# Patient Record
Sex: FEMALE | Race: WHITE | NOT HISPANIC OR LATINO
[De-identification: names, ages, dates, MRNs, and addresses within clinical notes are randomized per-mention and may not be internally consistent; named-entity substitution may affect disease eponyms.]

---

## 2018-01-31 ENCOUNTER — RESULT REVIEW (OUTPATIENT)
Age: 33
End: 2018-01-31

## 2018-02-27 ENCOUNTER — RESULT REVIEW (OUTPATIENT)
Age: 33
End: 2018-02-27

## 2018-06-08 ENCOUNTER — OUTPATIENT (OUTPATIENT)
Dept: OUTPATIENT SERVICES | Facility: HOSPITAL | Age: 33
LOS: 1 days | End: 2018-06-08
Payer: COMMERCIAL

## 2018-06-08 VITALS
OXYGEN SATURATION: 100 % | HEIGHT: 62.5 IN | HEART RATE: 109 BPM | SYSTOLIC BLOOD PRESSURE: 131 MMHG | RESPIRATION RATE: 14 BRPM | DIASTOLIC BLOOD PRESSURE: 85 MMHG | WEIGHT: 159.39 LBS | TEMPERATURE: 98 F

## 2018-06-08 DIAGNOSIS — K08.409 PARTIAL LOSS OF TEETH, UNSPECIFIED CAUSE, UNSPECIFIED CLASS: Chronic | ICD-10-CM

## 2018-06-08 DIAGNOSIS — Z01.818 ENCOUNTER FOR OTHER PREPROCEDURAL EXAMINATION: ICD-10-CM

## 2018-06-08 DIAGNOSIS — K80.10 CALCULUS OF GALLBLADDER WITH CHRONIC CHOLECYSTITIS WITHOUT OBSTRUCTION: ICD-10-CM

## 2018-06-08 DIAGNOSIS — K80.20 CALCULUS OF GALLBLADDER WITHOUT CHOLECYSTITIS WITHOUT OBSTRUCTION: ICD-10-CM

## 2018-06-08 DIAGNOSIS — K43.6 OTHER AND UNSPECIFIED VENTRAL HERNIA WITH OBSTRUCTION, WITHOUT GANGRENE: ICD-10-CM

## 2018-06-08 DIAGNOSIS — T75.3XXA MOTION SICKNESS, INITIAL ENCOUNTER: ICD-10-CM

## 2018-06-08 LAB
ALBUMIN SERPL ELPH-MCNC: 3.8 G/DL — SIGNIFICANT CHANGE UP (ref 3.3–5)
ALP SERPL-CCNC: 47 U/L — SIGNIFICANT CHANGE UP (ref 30–120)
ALT FLD-CCNC: 23 U/L DA — SIGNIFICANT CHANGE UP (ref 10–60)
ANION GAP SERPL CALC-SCNC: 9 MMOL/L — SIGNIFICANT CHANGE UP (ref 5–17)
AST SERPL-CCNC: 19 U/L — SIGNIFICANT CHANGE UP (ref 10–40)
BILIRUB SERPL-MCNC: 0.3 MG/DL — SIGNIFICANT CHANGE UP (ref 0.2–1.2)
BUN SERPL-MCNC: 10 MG/DL — SIGNIFICANT CHANGE UP (ref 7–23)
CALCIUM SERPL-MCNC: 9.1 MG/DL — SIGNIFICANT CHANGE UP (ref 8.4–10.5)
CHLORIDE SERPL-SCNC: 104 MMOL/L — SIGNIFICANT CHANGE UP (ref 96–108)
CO2 SERPL-SCNC: 27 MMOL/L — SIGNIFICANT CHANGE UP (ref 22–31)
CREAT SERPL-MCNC: 1 MG/DL — SIGNIFICANT CHANGE UP (ref 0.5–1.3)
GLUCOSE SERPL-MCNC: 73 MG/DL — SIGNIFICANT CHANGE UP (ref 70–99)
HCT VFR BLD CALC: 38.3 % — SIGNIFICANT CHANGE UP (ref 34.5–45)
HGB BLD-MCNC: 13.2 G/DL — SIGNIFICANT CHANGE UP (ref 11.5–15.5)
MCHC RBC-ENTMCNC: 31.8 PG — SIGNIFICANT CHANGE UP (ref 27–34)
MCHC RBC-ENTMCNC: 34.5 GM/DL — SIGNIFICANT CHANGE UP (ref 32–36)
MCV RBC AUTO: 92.2 FL — SIGNIFICANT CHANGE UP (ref 80–100)
PLATELET # BLD AUTO: 380 K/UL — SIGNIFICANT CHANGE UP (ref 150–400)
POTASSIUM SERPL-MCNC: 3.4 MMOL/L — LOW (ref 3.5–5.3)
POTASSIUM SERPL-SCNC: 3.4 MMOL/L — LOW (ref 3.5–5.3)
PROT SERPL-MCNC: 8.3 G/DL — SIGNIFICANT CHANGE UP (ref 6–8.3)
RBC # BLD: 4.15 M/UL — SIGNIFICANT CHANGE UP (ref 3.8–5.2)
RBC # FLD: 10.8 % — SIGNIFICANT CHANGE UP (ref 10.3–14.5)
SODIUM SERPL-SCNC: 140 MMOL/L — SIGNIFICANT CHANGE UP (ref 135–145)
WBC # BLD: 6.1 K/UL — SIGNIFICANT CHANGE UP (ref 3.8–10.5)
WBC # FLD AUTO: 6.1 K/UL — SIGNIFICANT CHANGE UP (ref 3.8–10.5)

## 2018-06-08 PROCEDURE — G0463: CPT

## 2018-06-08 PROCEDURE — 36415 COLL VENOUS BLD VENIPUNCTURE: CPT

## 2018-06-08 PROCEDURE — 86900 BLOOD TYPING SEROLOGIC ABO: CPT

## 2018-06-08 PROCEDURE — 85027 COMPLETE CBC AUTOMATED: CPT

## 2018-06-08 PROCEDURE — 86850 RBC ANTIBODY SCREEN: CPT

## 2018-06-08 PROCEDURE — 86901 BLOOD TYPING SEROLOGIC RH(D): CPT

## 2018-06-08 PROCEDURE — 80053 COMPREHEN METABOLIC PANEL: CPT

## 2018-06-08 RX ORDER — CHLORHEXIDINE GLUCONATE 213 G/1000ML
1 SOLUTION TOPICAL ONCE
Qty: 0 | Refills: 0 | Status: DISCONTINUED | OUTPATIENT
Start: 2018-06-08 | End: 2018-06-23

## 2018-06-08 NOTE — H&P PST ADULT - FAMILY HISTORY
Mother  Still living? Yes, Estimated age: 51-60  Family history of heart failure, Age at diagnosis: Age Unknown  Family history of gout, Age at diagnosis: Age Unknown  Family history of arthritis, Age at diagnosis: Age Unknown  Family history of essential hypertension, Age at diagnosis: Age Unknown     Grandparent  Still living? Yes, Estimated age: 71-80  Family history of melanoma, Age at diagnosis: Age Unknown  Family history of heart attack, Age at diagnosis: Age Unknown

## 2018-06-08 NOTE — H&P PST ADULT - HISTORY OF PRESENT ILLNESS
this is a 32 y/o female who had RUQ pain 2 weeks ago for several days and has soreness now; also has pain after eating; had tests which showed gallstone; also upon exam hernia found- to have gallbladder removed and hernia repaired

## 2018-06-08 NOTE — H&P PST ADULT - NSANTHOSAYNRD_GEN_A_CORE
No. DARSHAN screening performed.  STOP BANG Legend: 0-2 = LOW Risk; 3-4 = INTERMEDIATE Risk; 5-8 = HIGH Risk

## 2018-06-12 NOTE — ASU DISCHARGE PLAN (ADULT/PEDIATRIC). - "IF YOU OR YOUR GUARDIAN/FAMILY IS A SMOKER, IT IS IMPORTANT FOR YOUR HEALTH TO STOP SMOKING. PLEASE BE AWARE THAT SECOND HAND SMOKE IS ALSO HARMFUL."
Request for:  Metoprolol  Benazepril  Amlodipine  Pravastatin    Last OV 10/27/17 with note to RTC in six months. Due for OV late March/early April  One refill for 90 days sent   Statement Selected

## 2018-06-12 NOTE — ASU DISCHARGE PLAN (ADULT/PEDIATRIC). - SPECIAL INSTRUCTIONS
apply ice to operative site 20 minutes on and 20 minutes off for next 48 hours  Pain meds as per MD  Take an over the counter stool softener like Colace to avoid constipation while taking a narcotic for pain REST!  Relax!  Ice packs to operative sites and shoulders 30 minutes on and 30 minutes every hour while awake for next 48 hours.  May take Tylenol for minor pain.  Please minimize Aspirin, Advil and Motrin for next 48 hours.  A script for Oxycodone has been forwarded to your pharmacy.  Take an over the counter stool softener like COLACE twice daily to prevent constipation.

## 2018-06-12 NOTE — ASU DISCHARGE PLAN (ADULT/PEDIATRIC). - NOTIFY
Persistent Nausea and Vomiting/Fever greater than 101/Unable to Urinate/Bleeding that does not stop/Pain not relieved by Medications

## 2018-06-12 NOTE — ASU DISCHARGE PLAN (ADULT/PEDIATRIC). - MEDICATION SUMMARY - MEDICATIONS TO TAKE
I will START or STAY ON the medications listed below when I get home from the hospital:    oxyCODONE 5 mg oral tablet  -- 1 tab(s) by mouth every 4 hours, As Needed MDD:6  -- Indication: For Treatment of incisional pain.    Valtrex 500 mg oral tablet  -- 1 tab(s) by mouth once a day  -- Indication: For Continuation of home medication.    levonorgestrel-ethinyl estradiol 100 mcg-20 mcg oral tablet  -- 1 tab(s) by mouth once a day  -- Indication: For Continuation of home medication.

## 2018-06-12 NOTE — ASU DISCHARGE PLAN (ADULT/PEDIATRIC). - FOLLOWUP APPOINTMENT CLINIC/PHYSICIAN
Call Dr. David's office at [phone] to make an appointment to be seen within 7- 10 days Call Dr. David's office at [phone] to make an appointment to be seen in ~2 weeks.

## 2018-06-17 ENCOUNTER — TRANSCRIPTION ENCOUNTER (OUTPATIENT)
Age: 33
End: 2018-06-17

## 2018-06-18 ENCOUNTER — OUTPATIENT (OUTPATIENT)
Dept: OUTPATIENT SERVICES | Facility: HOSPITAL | Age: 33
LOS: 1 days | End: 2018-06-18
Payer: COMMERCIAL

## 2018-06-18 ENCOUNTER — RESULT REVIEW (OUTPATIENT)
Age: 33
End: 2018-06-18

## 2018-06-18 VITALS
RESPIRATION RATE: 17 BRPM | OXYGEN SATURATION: 100 % | WEIGHT: 157.63 LBS | DIASTOLIC BLOOD PRESSURE: 77 MMHG | TEMPERATURE: 99 F | HEART RATE: 81 BPM | HEIGHT: 62 IN | SYSTOLIC BLOOD PRESSURE: 133 MMHG

## 2018-06-18 VITALS
OXYGEN SATURATION: 99 % | DIASTOLIC BLOOD PRESSURE: 74 MMHG | HEART RATE: 70 BPM | SYSTOLIC BLOOD PRESSURE: 121 MMHG | RESPIRATION RATE: 15 BRPM

## 2018-06-18 DIAGNOSIS — K43.6 OTHER AND UNSPECIFIED VENTRAL HERNIA WITH OBSTRUCTION, WITHOUT GANGRENE: ICD-10-CM

## 2018-06-18 DIAGNOSIS — K80.10 CALCULUS OF GALLBLADDER WITH CHRONIC CHOLECYSTITIS WITHOUT OBSTRUCTION: ICD-10-CM

## 2018-06-18 DIAGNOSIS — K08.409 PARTIAL LOSS OF TEETH, UNSPECIFIED CAUSE, UNSPECIFIED CLASS: Chronic | ICD-10-CM

## 2018-06-18 LAB
ABO RH CONFIRMATION: SIGNIFICANT CHANGE UP
POTASSIUM SERPL-MCNC: 3.7 MMOL/L — SIGNIFICANT CHANGE UP (ref 3.5–5.3)
POTASSIUM SERPL-SCNC: 3.7 MMOL/L — SIGNIFICANT CHANGE UP (ref 3.5–5.3)

## 2018-06-18 PROCEDURE — 88302 TISSUE EXAM BY PATHOLOGIST: CPT

## 2018-06-18 PROCEDURE — 47562 LAPAROSCOPIC CHOLECYSTECTOMY: CPT

## 2018-06-18 PROCEDURE — C1889: CPT

## 2018-06-18 PROCEDURE — 47562 LAPAROSCOPIC CHOLECYSTECTOMY: CPT | Mod: AS

## 2018-06-18 PROCEDURE — 84132 ASSAY OF SERUM POTASSIUM: CPT

## 2018-06-18 PROCEDURE — 49561: CPT | Mod: AS,59

## 2018-06-18 PROCEDURE — 88302 TISSUE EXAM BY PATHOLOGIST: CPT | Mod: 26

## 2018-06-18 RX ORDER — HYDROMORPHONE HYDROCHLORIDE 2 MG/ML
0.5 INJECTION INTRAMUSCULAR; INTRAVENOUS; SUBCUTANEOUS
Qty: 0 | Refills: 0 | Status: DISCONTINUED | OUTPATIENT
Start: 2018-06-18 | End: 2018-06-18

## 2018-06-18 RX ORDER — ONDANSETRON 8 MG/1
4 TABLET, FILM COATED ORAL ONCE
Qty: 0 | Refills: 0 | Status: DISCONTINUED | OUTPATIENT
Start: 2018-06-18 | End: 2018-06-18

## 2018-06-18 RX ORDER — METOCLOPRAMIDE HCL 10 MG
10 TABLET ORAL ONCE
Qty: 0 | Refills: 0 | Status: COMPLETED | OUTPATIENT
Start: 2018-06-18 | End: 2018-06-18

## 2018-06-18 RX ORDER — OXYCODONE HYDROCHLORIDE 5 MG/1
1 TABLET ORAL
Qty: 30 | Refills: 0
Start: 2018-06-18

## 2018-06-18 RX ORDER — CHLORHEXIDINE GLUCONATE 213 G/1000ML
1 SOLUTION TOPICAL ONCE
Qty: 0 | Refills: 0 | Status: COMPLETED | OUTPATIENT
Start: 2018-06-18 | End: 2018-06-18

## 2018-06-18 RX ORDER — ACETAMINOPHEN 500 MG
1000 TABLET ORAL ONCE
Qty: 0 | Refills: 0 | Status: COMPLETED | OUTPATIENT
Start: 2018-06-18 | End: 2018-06-18

## 2018-06-18 RX ORDER — CEFAZOLIN SODIUM 1 G
2000 VIAL (EA) INJECTION ONCE
Qty: 0 | Refills: 0 | Status: COMPLETED | OUTPATIENT
Start: 2018-06-18 | End: 2018-06-18

## 2018-06-18 RX ORDER — SODIUM CHLORIDE 9 MG/ML
1000 INJECTION, SOLUTION INTRAVENOUS
Qty: 0 | Refills: 0 | Status: DISCONTINUED | OUTPATIENT
Start: 2018-06-18 | End: 2018-06-18

## 2018-06-18 RX ORDER — KETOROLAC TROMETHAMINE 30 MG/ML
30 SYRINGE (ML) INJECTION ONCE
Qty: 0 | Refills: 0 | Status: DISCONTINUED | OUTPATIENT
Start: 2018-06-18 | End: 2018-06-18

## 2018-06-18 RX ADMIN — Medication 200 MILLIGRAM(S): at 15:15

## 2018-06-18 RX ADMIN — Medication 10 MILLIGRAM(S): at 14:34

## 2018-06-18 RX ADMIN — Medication 30 MILLIGRAM(S): at 14:02

## 2018-06-18 RX ADMIN — SODIUM CHLORIDE 75 MILLILITER(S): 9 INJECTION, SOLUTION INTRAVENOUS at 14:12

## 2018-06-18 RX ADMIN — Medication 400 MILLIGRAM(S): at 13:55

## 2018-06-18 RX ADMIN — HYDROMORPHONE HYDROCHLORIDE 0.5 MILLIGRAM(S): 2 INJECTION INTRAMUSCULAR; INTRAVENOUS; SUBCUTANEOUS at 14:28

## 2018-06-18 RX ADMIN — Medication 1000 MILLIGRAM(S): at 14:13

## 2018-06-18 RX ADMIN — Medication 30 MILLIGRAM(S): at 14:28

## 2018-06-18 RX ADMIN — CHLORHEXIDINE GLUCONATE 1 APPLICATION(S): 213 SOLUTION TOPICAL at 10:45

## 2018-06-18 RX ADMIN — HYDROMORPHONE HYDROCHLORIDE 0.5 MILLIGRAM(S): 2 INJECTION INTRAMUSCULAR; INTRAVENOUS; SUBCUTANEOUS at 14:14

## 2018-06-18 NOTE — BRIEF OPERATIVE NOTE - PRE-OP DX
Calculus of gallbladder with chronic cholecystitis without obstruction  06/18/2018    Active  Ilia Alejo  Other ventral hernia with obstruction and without gangrene  06/18/2018    Active  Ilia Alejo

## 2018-06-18 NOTE — BRIEF OPERATIVE NOTE - PROCEDURE
<<-----Click on this checkbox to enter Procedure Repair, ventral hernia, incarcerated, initial  06/18/2018  Laparoscopic cholecystectomy with open repair of incarcerated ventral hernia without mesh.  Active  Ilia Alejo  Laparoscopic cholecystectomy  06/18/2018    Active  Ilia Alejo J

## 2018-09-11 PROBLEM — G47.00 INSOMNIA, UNSPECIFIED: Chronic | Status: ACTIVE | Noted: 2018-06-08

## 2018-09-11 PROBLEM — T75.3XXA MOTION SICKNESS, INITIAL ENCOUNTER: Chronic | Status: ACTIVE | Noted: 2018-06-08

## 2018-09-11 PROBLEM — R51 HEADACHE: Chronic | Status: ACTIVE | Noted: 2018-06-08

## 2018-09-20 PROBLEM — Z00.00 ENCOUNTER FOR PREVENTIVE HEALTH EXAMINATION: Status: ACTIVE | Noted: 2018-09-20

## 2018-10-24 ENCOUNTER — TRANSCRIPTION ENCOUNTER (OUTPATIENT)
Age: 33
End: 2018-10-24

## 2018-10-24 ENCOUNTER — APPOINTMENT (OUTPATIENT)
Dept: PULMONOLOGY | Facility: CLINIC | Age: 33
End: 2018-10-24
Payer: COMMERCIAL

## 2018-10-24 VITALS
DIASTOLIC BLOOD PRESSURE: 85 MMHG | OXYGEN SATURATION: 96 % | SYSTOLIC BLOOD PRESSURE: 135 MMHG | HEART RATE: 83 BPM | HEIGHT: 62 IN

## 2018-10-24 PROCEDURE — 99204 OFFICE O/P NEW MOD 45 MIN: CPT

## 2018-10-25 ENCOUNTER — MEDICATION RENEWAL (OUTPATIENT)
Age: 33
End: 2018-10-25

## 2018-10-25 RX ORDER — MULTIVITAMIN
TABLET ORAL
Refills: 0 | Status: ACTIVE | COMMUNITY

## 2018-10-26 ENCOUNTER — MEDICATION RENEWAL (OUTPATIENT)
Age: 33
End: 2018-10-26

## 2018-10-31 ENCOUNTER — APPOINTMENT (OUTPATIENT)
Dept: PULMONOLOGY | Facility: CLINIC | Age: 33
End: 2018-10-31

## 2018-11-27 ENCOUNTER — APPOINTMENT (OUTPATIENT)
Dept: PULMONOLOGY | Facility: CLINIC | Age: 33
End: 2018-11-27
Payer: COMMERCIAL

## 2018-11-27 PROCEDURE — 96101: CPT

## 2018-11-27 PROCEDURE — 90791 PSYCH DIAGNOSTIC EVALUATION: CPT | Mod: 59

## 2018-11-30 ENCOUNTER — MEDICATION RENEWAL (OUTPATIENT)
Age: 33
End: 2018-11-30

## 2018-12-04 ENCOUNTER — APPOINTMENT (OUTPATIENT)
Dept: PULMONOLOGY | Facility: CLINIC | Age: 33
End: 2018-12-04
Payer: COMMERCIAL

## 2018-12-04 PROCEDURE — 90837 PSYTX W PT 60 MINUTES: CPT

## 2018-12-11 ENCOUNTER — APPOINTMENT (OUTPATIENT)
Dept: PULMONOLOGY | Facility: CLINIC | Age: 33
End: 2018-12-11

## 2018-12-28 ENCOUNTER — MEDICATION RENEWAL (OUTPATIENT)
Age: 33
End: 2018-12-28

## 2019-01-08 ENCOUNTER — APPOINTMENT (OUTPATIENT)
Dept: PULMONOLOGY | Facility: CLINIC | Age: 34
End: 2019-01-08

## 2019-01-23 ENCOUNTER — MEDICATION RENEWAL (OUTPATIENT)
Age: 34
End: 2019-01-23

## 2019-02-05 ENCOUNTER — RESULT REVIEW (OUTPATIENT)
Age: 34
End: 2019-02-05

## 2019-02-11 ENCOUNTER — MEDICATION RENEWAL (OUTPATIENT)
Age: 34
End: 2019-02-11

## 2019-02-15 ENCOUNTER — RESULT REVIEW (OUTPATIENT)
Age: 34
End: 2019-02-15

## 2019-03-03 ENCOUNTER — TRANSCRIPTION ENCOUNTER (OUTPATIENT)
Age: 34
End: 2019-03-03

## 2019-03-06 ENCOUNTER — APPOINTMENT (OUTPATIENT)
Dept: PULMONOLOGY | Facility: CLINIC | Age: 34
End: 2019-03-06

## 2019-03-18 ENCOUNTER — MEDICATION RENEWAL (OUTPATIENT)
Age: 34
End: 2019-03-18

## 2019-04-19 ENCOUNTER — MEDICATION RENEWAL (OUTPATIENT)
Age: 34
End: 2019-04-19

## 2019-05-22 ENCOUNTER — MEDICATION RENEWAL (OUTPATIENT)
Age: 34
End: 2019-05-22

## 2019-07-18 ENCOUNTER — MEDICATION RENEWAL (OUTPATIENT)
Age: 34
End: 2019-07-18

## 2019-08-21 ENCOUNTER — MEDICATION RENEWAL (OUTPATIENT)
Age: 34
End: 2019-08-21

## 2019-08-26 ENCOUNTER — APPOINTMENT (OUTPATIENT)
Dept: PULMONOLOGY | Facility: CLINIC | Age: 34
End: 2019-08-26

## 2019-10-09 ENCOUNTER — MEDICATION RENEWAL (OUTPATIENT)
Age: 34
End: 2019-10-09

## 2019-11-18 ENCOUNTER — APPOINTMENT (OUTPATIENT)
Dept: PULMONOLOGY | Facility: CLINIC | Age: 34
End: 2019-11-18
Payer: COMMERCIAL

## 2019-11-18 VITALS
SYSTOLIC BLOOD PRESSURE: 126 MMHG | WEIGHT: 149 LBS | BODY MASS INDEX: 27.42 KG/M2 | DIASTOLIC BLOOD PRESSURE: 84 MMHG | TEMPERATURE: 98 F | HEART RATE: 87 BPM | HEIGHT: 62 IN | RESPIRATION RATE: 16 BRPM | OXYGEN SATURATION: 98 %

## 2019-11-18 PROCEDURE — 99214 OFFICE O/P EST MOD 30 MIN: CPT

## 2019-11-18 NOTE — ASSESSMENT
[FreeTextEntry1] : 35yo F with history of chronic insomnia difficulty falling and staying asleep. Typically, she will take OTC sleep aids around 10pm. She will usually fall asleep within an hour but then wake up several times throughout the night. 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 Her sleep is significantly fragmented with 3-6 awakenings per night. If she were to sleep later, she still wakes up frequently. \par \par For her insomnia, she has been on zolpidem 5mg nightly. She continues to deny snoring, nocturnal gasping, choking, witnessed apneas and has not gained any significant weight recently. \par \par Chronic Insomnia\par She has tried CBT but didn’t find that helpful\par Recommended relaxation technique consistently right before sleep to link this behavior to falling asleep\par Offered CBT again -- declined\par Ambien 5mg -- will refill

## 2019-11-18 NOTE — HISTORY OF PRESENT ILLNESS
[FreeTextEntry1] : 33yo F with history of chronic insomnia difficulty falling and staying asleep. Typically, she will take OTC sleep aids around 10pm. She will usually fall asleep within an hour but then wake up several times throughout the night. 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 Her sleep is significantly fragmented with 3-6 awakenings per night. If she were to sleep later, she still wakes up frequently. \par \par For her insomnia, she has been on zolpidem 5mg nightly. She continues to deny snoring, nocturnal gasping, choking, witnessed apneas and has not gained any significant weight recently.

## 2019-11-18 NOTE — PHYSICAL EXAM
[General Appearance - In No Acute Distress] : no acute distress [Normal Conjunctiva] : the conjunctiva exhibited no abnormalities [II] : II [Heart Rate And Rhythm] : heart rate was normal and rhythm regular [Neck Appearance] : the appearance of the neck was normal [Heart Sounds] : normal S1 and S2 [Involuntary Movements] : no involuntary movements were seen [Auscultation Breath Sounds / Voice Sounds] : lungs were clear to auscultation bilaterally [Respiration, Rhythm And Depth] : normal respiratory rhythm and effort [Non-Pitting] : non-pitting [Nail Clubbing] : no clubbing of the fingernails [No Focal Deficits] : no focal deficits [Skin Color & Pigmentation] : normal skin color and pigmentation [Oriented To Time, Place, And Person] : oriented to person, place, and time [Normal Oropharynx] : abnormal oropharynx

## 2019-11-18 NOTE — REVIEW OF SYSTEMS
[Fatigue] : fatigue [Difficulty Maintaining Sleep] : difficulty maintaining sleep [Difficulty Initiating Sleep] : difficulty falling asleep [Negative] : Psychiatric [EDS: ESS=____] : no daytime somnolence [Snoring] : no snoring [Chest Pain] : no chest pain [Obesity] : not obese [Anemia] : no anemia [A.M. Headache] : no headache present upon awakening [Heartburn] : no heartburn [Nocturia] : no nocturia [Lower Extremity Discomfort] : no lower extremity discomfort [Unusual Sleep Behavior] : no unusual sleep behavior [Cataplexy] :  no cataplexy

## 2019-12-09 ENCOUNTER — MEDICATION RENEWAL (OUTPATIENT)
Age: 34
End: 2019-12-09

## 2020-01-03 ENCOUNTER — MEDICATION RENEWAL (OUTPATIENT)
Age: 35
End: 2020-01-03

## 2020-02-09 ENCOUNTER — RESULT REVIEW (OUTPATIENT)
Age: 35
End: 2020-02-09

## 2020-03-18 ENCOUNTER — RESULT REVIEW (OUTPATIENT)
Age: 35
End: 2020-03-18

## 2020-10-25 ENCOUNTER — RX RENEWAL (OUTPATIENT)
Age: 35
End: 2020-10-25

## 2020-12-30 ENCOUNTER — APPOINTMENT (OUTPATIENT)
Dept: PULMONOLOGY | Facility: CLINIC | Age: 35
End: 2020-12-30

## 2021-01-05 ENCOUNTER — APPOINTMENT (OUTPATIENT)
Dept: PULMONOLOGY | Facility: CLINIC | Age: 36
End: 2021-01-05
Payer: COMMERCIAL

## 2021-01-05 PROCEDURE — 99215 OFFICE O/P EST HI 40 MIN: CPT | Mod: 95

## 2021-01-05 NOTE — REVIEW OF SYSTEMS
[Fatigue] : fatigue [Difficulty Initiating Sleep] : difficulty falling asleep [Difficulty Maintaining Sleep] : difficulty maintaining sleep [Negative] : Psychiatric [EDS: ESS=____] : no daytime somnolence [Snoring] : no snoring [Chest Pain] : no chest pain [Obesity] : not obese [Anemia] : no anemia [A.M. Headache] : no headache present upon awakening [Heartburn] : no heartburn [Nocturia] : no nocturia [Lower Extremity Discomfort] : no lower extremity discomfort [Unusual Sleep Behavior] : no unusual sleep behavior [Cataplexy] :  no cataplexy

## 2021-01-05 NOTE — ASSESSMENT
[FreeTextEntry1] : 34yo F with history of chronic insomnia difficulty falling and staying asleep. She has tried OTC sleep aids in the past without much help. She will usually fall asleep within an hour but then wake up several times throughout the night. 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 Her sleep is significantly fragmented with 3-6 awakenings per night. If she were to sleep later, she still wakes up frequently. \par \par For her insomnia, she had been on zolpidem 5mg nightly. We switched this recently to 6.25mg ER daily. However, this is only intermittently effective for her and she would like to try a higher dose of 12.5mg daily. Currently not experiencing any side effects related to zolpidem. \par \par She continues to deny snoring, nocturnal gasping, choking, witnessed apneas and has not gained any significant weight recently. \par \par Chronic insomnia\par No therapeutic benefit with CBT in the past\par Ambien 6.25mg -- patient to try a higher dose tonight and will call tomorrow to let me know  how she fares\par Advised on "drug holidays" or intermittent rather than continuous use of ambien in order to maintain efficacy\par \par Stimulus control: I advised the patient to avoid spending large amounts of time in bed awake, to get into bed only when sleepy in order to increase sleep efficiency. I explained the role of this intervention in stregthening the connection between the bed, bedroom environment and the ability to fall asleep.\par

## 2021-01-05 NOTE — REASON FOR VISIT
[Home] : at home, [unfilled] , at the time of the visit. [Medical Office: ([facility])___] : at the medical office located in  [Verbal consent obtained from patient] : the patient, [unfilled] [Follow-Up] : a follow-up visit

## 2021-01-05 NOTE — HISTORY OF PRESENT ILLNESS
[FreeTextEntry1] : 34yo F with history of chronic insomnia difficulty falling and staying asleep. She has tried OTC sleep aids in the past without much help. She will usually fall asleep within an hour but then wake up several times throughout the night. 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 Her sleep is significantly fragmented with 3-6 awakenings per night. If she were to sleep later, she still wakes up frequently. \par \par For her insomnia, she had been on zolpidem 5mg nightly. We switched this recently to 6.25mg ER daily. However, this is only intermittently effective for her and she would like to try a higher dose of 12.5mg daily. Currently not experiencing any side effects related to zolpidem. \par \par She continues to deny snoring, nocturnal gasping, choking, witnessed apneas and has not gained any significant weight recently.

## 2021-01-06 RX ORDER — ZOLPIDEM TARTRATE 5 MG/1
5 TABLET ORAL
Qty: 30 | Refills: 3 | Status: DISCONTINUED | COMMUNITY
Start: 2018-10-25 | End: 2021-01-06

## 2021-01-19 ENCOUNTER — NON-APPOINTMENT (OUTPATIENT)
Age: 36
End: 2021-01-19

## 2021-01-20 RX ORDER — ZOLPIDEM TARTRATE 12.5 MG/1
12.5 TABLET, EXTENDED RELEASE ORAL
Qty: 30 | Refills: 1 | Status: DISCONTINUED | COMMUNITY
Start: 2020-06-09 | End: 2021-01-20

## 2021-02-23 ENCOUNTER — APPOINTMENT (OUTPATIENT)
Dept: PULMONOLOGY | Facility: CLINIC | Age: 36
End: 2021-02-23

## 2021-03-28 ENCOUNTER — NON-APPOINTMENT (OUTPATIENT)
Age: 36
End: 2021-03-28

## 2021-03-29 ENCOUNTER — APPOINTMENT (OUTPATIENT)
Dept: INFECTIOUS DISEASE | Facility: CLINIC | Age: 36
End: 2021-03-29
Payer: COMMERCIAL

## 2021-03-29 ENCOUNTER — NON-APPOINTMENT (OUTPATIENT)
Age: 36
End: 2021-03-29

## 2021-03-29 ENCOUNTER — LABORATORY RESULT (OUTPATIENT)
Age: 36
End: 2021-03-29

## 2021-03-29 VITALS
BODY MASS INDEX: 27.6 KG/M2 | SYSTOLIC BLOOD PRESSURE: 138 MMHG | WEIGHT: 150 LBS | OXYGEN SATURATION: 98 % | DIASTOLIC BLOOD PRESSURE: 89 MMHG | HEART RATE: 121 BPM | HEIGHT: 62 IN | TEMPERATURE: 99.7 F

## 2021-03-29 DIAGNOSIS — Z82.61 FAMILY HISTORY OF ARTHRITIS: ICD-10-CM

## 2021-03-29 DIAGNOSIS — L71.9 ROSACEA, UNSPECIFIED: ICD-10-CM

## 2021-03-29 DIAGNOSIS — Z83.438 FAMILY HISTORY OF OTHER DISORDER OF LIPOPROTEIN METABOLISM AND OTHER LIPIDEMIA: ICD-10-CM

## 2021-03-29 DIAGNOSIS — Z78.9 OTHER SPECIFIED HEALTH STATUS: ICD-10-CM

## 2021-03-29 DIAGNOSIS — Z82.5 FAMILY HISTORY OF ASTHMA AND OTHER CHRONIC LOWER RESPIRATORY DISEASES: ICD-10-CM

## 2021-03-29 DIAGNOSIS — M25.562 PAIN IN LEFT KNEE: ICD-10-CM

## 2021-03-29 PROCEDURE — 99203 OFFICE O/P NEW LOW 30 MIN: CPT

## 2021-03-29 PROCEDURE — 99072 ADDL SUPL MATRL&STAF TM PHE: CPT

## 2021-03-30 LAB
B BURGDOR AB SER-IMP: NEGATIVE
B BURGDOR IGG+IGM SER QL IB: NORMAL
B BURGDOR IGM PATRN SER IB-IMP: NEGATIVE
B BURGDOR18KD IGG SER QL IB: NORMAL
B BURGDOR23KD IGG SER QL IB: NORMAL
B BURGDOR23KD IGM SER QL IB: NORMAL
B BURGDOR28KD IGG SER QL IB: NORMAL
B BURGDOR30KD IGG SER QL IB: NORMAL
B BURGDOR31KD IGG SER QL IB: NORMAL
B BURGDOR39KD IGG SER QL IB: NORMAL
B BURGDOR39KD IGM SER QL IB: NORMAL
B BURGDOR41KD IGG SER QL IB: PRESENT
B BURGDOR41KD IGM SER QL IB: PRESENT
B BURGDOR45KD IGG SER QL IB: NORMAL
B BURGDOR58KD IGG SER QL IB: NORMAL
B BURGDOR66KD IGG SER QL IB: NORMAL
B BURGDOR93KD IGG SER QL IB: NORMAL

## 2021-03-31 PROBLEM — Z83.438 FAMILY HISTORY OF HYPERLIPIDEMIA: Status: ACTIVE | Noted: 2021-03-31

## 2021-03-31 PROBLEM — Z78.9 CONSUMES ALCOHOL OCCASIONALLY: Status: ACTIVE | Noted: 2021-03-31

## 2021-03-31 PROBLEM — Z82.61 FAMILY HISTORY OF RHEUMATOID ARTHRITIS: Status: ACTIVE | Noted: 2021-03-31

## 2021-03-31 PROBLEM — L71.9 ROSACEA: Status: ACTIVE | Noted: 2021-03-31

## 2021-03-31 PROBLEM — Z82.5 FAMILY HISTORY OF CHRONIC OBSTRUCTIVE PULMONARY DISEASE: Status: ACTIVE | Noted: 2021-03-31

## 2021-03-31 NOTE — PHYSICAL EXAM
[General Appearance - Alert] : alert [General Appearance - In No Acute Distress] : in no acute distress [General Appearance - Well Nourished] : well nourished [General Appearance - Well-Appearing] : healthy appearing [Sclera] : the sclera and conjunctiva were normal [PERRL With Normal Accommodation] : pupils were equal in size, round, reactive to light [Extraocular Movements] : extraocular movements were intact [Outer Ear] : the ears and nose were normal in appearance [Hearing Threshold Finger Rub Not [place]] : hearing was normal [Oropharynx] : the oropharynx was normal with no thrush [Examination Of The Oral Cavity] : the lips and gums were normal [Neck Appearance] : the appearance of the neck was normal [Neck Cervical Mass (___cm)] : no neck mass was observed [Respiration, Rhythm And Depth] : normal respiratory rhythm and effort [Exaggerated Use Of Accessory Muscles For Inspiration] : no accessory muscle use [Auscultation Breath Sounds / Voice Sounds] : lungs were clear to auscultation bilaterally [Heart Rate And Rhythm] : heart rate was normal and rhythm regular [Heart Sounds] : normal S1 and S2 [Murmurs] : no murmurs [Bowel Sounds] : normal bowel sounds [Edema] : there was no peripheral edema [Abdomen Soft] : soft [Abdomen Tenderness] : non-tender [No Palpable Adenopathy] : no palpable adenopathy [Musculoskeletal - Swelling] : no joint swelling [Range of Motion to Joints] : range of motion to joints [Nail Clubbing] : no clubbing  or cyanosis of the fingernails [Motor Tone] : muscle strength and tone were normal [Skin Color & Pigmentation] : normal skin color and pigmentation [] : no rash [Skin Lesions] : no skin lesions [Sensation] : the sensory exam was normal to light touch and pinprick [Motor Exam] : the motor exam was normal [No Focal Deficits] : no focal deficits [Oriented To Time, Place, And Person] : oriented to person, place, and time [Affect] : the affect was normal [FreeTextEntry1] : left knee pain, no swellilng, nontender

## 2021-03-31 NOTE — DATA REVIEWED
[FreeTextEntry1] : On 3/18/21, Lyme testing WB negative IgG/ IgM\par Lyme IgG/ IgM negative, Lyme Disease Ab IgM 1.89 (H)\par RF negative\par ESR negative\par CRP Negative\par YANIRA negative

## 2021-03-31 NOTE — ASSESSMENT
[FreeTextEntry1] : Ms. Ashley is a 35 year old female sent for evaluation for Lyme Disease.\par \par She developed left knee pain from an injury from her [place] during a workout on Feb 3, 2020, being followed by Ortho, and has been going to PT and had a steroid injection on 3/11. \par \par She lives on [address]. She went camping in May 2020, [place] in July 2020, [facility] Sept 2020, and hiking in [place] Oct/ Nov 2020. She does not recall any tick bites. Never treated or diagnosed with Lyme Disease. She is a Psych Nurse. \par \par On 3/18/21, Lyme testing WB negative IgG/ IgM\par Lyme IgG/ IgM negative, Lyme Disease Ab IgM 1.89 (H)\par RF negative\par ESR negative\par CRP Negative\par YANIRA negative\par \par Recommend:\par #Left knee pain\par -Given no recent exposures to ticks, my suspicion for Lyme Disease as the cause of her pain is low. The pain persists on the left knee where she had her initial injury. No other joint pains. \par -Repeat Lyme C6 testing and WB. If negative, then this is not Lyme Disease.\par -F/U with Ortho.\par \par RTC as needed.

## 2021-03-31 NOTE — HISTORY OF PRESENT ILLNESS
[FreeTextEntry1] : Ms. Ashley is a 35 year old female sent for evaluation for Lyme Disease.\par \par The patient states that she developed left knee pain from an injury on her  [place] workout bike on Feb 3, 2020. She endorses doing an intense workup and developed pain. She completed physical therapy for a month but with little to no improvement. She had COVID over Christmas weekend, and then developed COVID PNA was on methylprednisone and an inhaler by her PMD. She had a repeat CXR in Feb 2021 which she reports was clear. She continues to have PT once or twice a week. On 3/16 had bloodwork which was negative for gonorrhea/ chlamydia, and she reports having a negative HIV test. She states that she had an MRI which demosntrated chondromalacia patellae. She was given a cortisone shot on 3/11.\par \par On 3/18/21, Lyme testing WB negative IgG/ IgM\par Lyme IgG/ IgM negative, Lyme Disease Ab IgM 1.89 (H)\par RF negative\par ESR negative\par CRP Negative\par YANIRA negative\par \par Her pain persists. She has Rosacea, so currently taking doxycycline 40 mg po daily. She lives on [address]. She went camping in May 2020, [place] in July 2020, [facility] Sept 2020, and hiking in [place] Oct/ Nov 2020. She does not recall any tick bites. Never treated or diagnosed with Lyme Disease. She is a Psych Nurse.\par \par She also endorses having an episode of neck pain and soreness in her fingers that has now resolved.\par \par PMHx: Rosacea\par \par PSHx: cholecystectomy 2-3 years ago. Umbilical hernia repair 2-3 year ago.\par \par FHx: Mom DM, Maternal GM diabetic, \par \par Parents with HLD, Mother RA, GM has RA. Mother COPD from smoking.\par Paternal GF with heart attack in his 50s.\par paternal GM had Lyme disease. \par \par No pets.\par \par Allergies: Pollen, grass, soaps and perfumes\par \par Social Hx: Social EtOH, No smoking, no drug use.\par \par Meds: Topomax, Fioricet, Valtrex PRN, Meloxicam, Motrin\par

## 2021-04-01 LAB — B BURGDOR DNA SPEC QL NAA+PROBE: NEGATIVE

## 2021-04-22 ENCOUNTER — RESULT REVIEW (OUTPATIENT)
Age: 36
End: 2021-04-22

## 2021-05-12 ENCOUNTER — RESULT REVIEW (OUTPATIENT)
Age: 36
End: 2021-05-12

## 2021-05-14 DIAGNOSIS — Z01.818 ENCOUNTER FOR OTHER PREPROCEDURAL EXAMINATION: ICD-10-CM

## 2021-05-15 ENCOUNTER — APPOINTMENT (OUTPATIENT)
Dept: DISASTER EMERGENCY | Facility: CLINIC | Age: 36
End: 2021-05-15

## 2021-05-16 LAB — SARS-COV-2 N GENE NPH QL NAA+PROBE: NOT DETECTED

## 2021-05-25 ENCOUNTER — APPOINTMENT (OUTPATIENT)
Dept: DISASTER EMERGENCY | Facility: CLINIC | Age: 36
End: 2021-05-25

## 2021-05-25 LAB — SARS-COV-2 N GENE NPH QL NAA+PROBE: NOT DETECTED

## 2021-05-28 ENCOUNTER — RESULT REVIEW (OUTPATIENT)
Age: 36
End: 2021-05-28

## 2021-05-28 ENCOUNTER — OUTPATIENT (OUTPATIENT)
Dept: OUTPATIENT SERVICES | Facility: HOSPITAL | Age: 36
LOS: 1 days | End: 2021-05-28
Payer: COMMERCIAL

## 2021-05-28 VITALS
HEART RATE: 677 BPM | RESPIRATION RATE: 16 BRPM | DIASTOLIC BLOOD PRESSURE: 72 MMHG | SYSTOLIC BLOOD PRESSURE: 119 MMHG | OXYGEN SATURATION: 99 %

## 2021-05-28 VITALS
TEMPERATURE: 98 F | DIASTOLIC BLOOD PRESSURE: 84 MMHG | SYSTOLIC BLOOD PRESSURE: 134 MMHG | WEIGHT: 147.93 LBS | OXYGEN SATURATION: 100 % | RESPIRATION RATE: 17 BRPM | HEART RATE: 107 BPM | HEIGHT: 62 IN

## 2021-05-28 DIAGNOSIS — K80.50 CALCULUS OF BILE DUCT WITHOUT CHOLANGITIS OR CHOLECYSTITIS WITHOUT OBSTRUCTION: ICD-10-CM

## 2021-05-28 DIAGNOSIS — Z90.49 ACQUIRED ABSENCE OF OTHER SPECIFIED PARTS OF DIGESTIVE TRACT: Chronic | ICD-10-CM

## 2021-05-28 DIAGNOSIS — R10.9 UNSPECIFIED ABDOMINAL PAIN: ICD-10-CM

## 2021-05-28 DIAGNOSIS — Z98.890 OTHER SPECIFIED POSTPROCEDURAL STATES: Chronic | ICD-10-CM

## 2021-05-28 DIAGNOSIS — K08.409 PARTIAL LOSS OF TEETH, UNSPECIFIED CAUSE, UNSPECIFIED CLASS: Chronic | ICD-10-CM

## 2021-05-28 PROCEDURE — 88305 TISSUE EXAM BY PATHOLOGIST: CPT

## 2021-05-28 PROCEDURE — 88305 TISSUE EXAM BY PATHOLOGIST: CPT | Mod: 26

## 2021-05-28 PROCEDURE — 43239 EGD BIOPSY SINGLE/MULTIPLE: CPT

## 2021-05-28 RX ORDER — ACETAMINOPHEN 500 MG
1000 TABLET ORAL ONCE
Refills: 0 | Status: COMPLETED | OUTPATIENT
Start: 2021-05-28 | End: 2021-05-28

## 2021-05-28 RX ORDER — RIZATRIPTAN BENZOATE 5 MG/1
1 TABLET ORAL
Qty: 0 | Refills: 0 | DISCHARGE

## 2021-05-28 RX ORDER — ONDANSETRON 8 MG/1
1 TABLET, FILM COATED ORAL
Qty: 0 | Refills: 0 | DISCHARGE

## 2021-05-28 RX ORDER — L.ACIDOPH/B.ANIMALIS/B.LONGUM 15B CELL
1 CAPSULE ORAL
Qty: 0 | Refills: 0 | DISCHARGE

## 2021-05-28 RX ORDER — VALACYCLOVIR 500 MG/1
1 TABLET, FILM COATED ORAL
Qty: 0 | Refills: 0 | DISCHARGE

## 2021-05-28 RX ORDER — SODIUM CHLORIDE 9 MG/ML
500 INJECTION INTRAMUSCULAR; INTRAVENOUS; SUBCUTANEOUS
Refills: 0 | Status: COMPLETED | OUTPATIENT
Start: 2021-05-28 | End: 2021-05-28

## 2021-05-28 RX ORDER — PANTOPRAZOLE SODIUM 20 MG/1
1 TABLET, DELAYED RELEASE ORAL
Qty: 0 | Refills: 0 | DISCHARGE

## 2021-05-28 RX ADMIN — SODIUM CHLORIDE 75 MILLILITER(S): 9 INJECTION INTRAMUSCULAR; INTRAVENOUS; SUBCUTANEOUS at 17:07

## 2021-05-28 RX ADMIN — Medication 400 MILLIGRAM(S): at 18:16

## 2021-05-28 NOTE — ASU PATIENT PROFILE, ADULT - PSH
H/O umbilical hernia repair    History of cholecystectomy    S/P tooth extraction  wisdom teeth removed 4

## 2021-05-28 NOTE — PRE PROCEDURE NOTE - PRE PROCEDURE EVALUATION
Attending Physician:    Devon Morales MD        Procedure: endoscopic ultrasound    Indication for Procedure: abnormal imaging  ________________________________________________________  PAST MEDICAL & SURGICAL HISTORY:  Headache  occasional    Motion sickness  car or boat related    Insomnia    S/P tooth extraction  wisdom teeth removed 4    H/O umbilical hernia repair    History of cholecystectomy      ALLERGIES:  nickel, methylchroroisothiazinolimine, ethyl acrylate, disperse blue mix, phenylenenediamine base (Rash)  No Known Drug Allergies  shellfish (Unknown)    HOME MEDICATIONS:  Ambien 10 mg oral tablet: 1 tab(s) orally once a day (at bedtime)  Benadryl 50 mg oral capsule: 1 cap(s) orally once, As Needed  levonorgestrel-ethinyl estradiol 100 mcg-20 mcg oral tablet: 1 tab(s) orally once a day  Melatonin 3 mg oral tablet: 1 tab(s) orally once a day (at bedtime)  MiraLax oral powder for reconstitution: 1 pad(s) orally once a day  Oracea 40 mg oral delayed release capsule: 1 cap(s) orally once a day (in the morning)  Probiotic Formula oral capsule: 1 cap(s) orally once a day  Protonix 40 mg oral delayed release tablet: 1 tab(s) orally once a day  rizatriptan 10 mg oral tablet: 1 tab(s) orally once a day, As Needed  Valtrex 500 mg oral tablet: 1 tab(s) orally once a day  Zofran 8 mg oral tablet: 1 tab(s) orally 3 times a day    AICD/PPM: [ ] yes   [X ] no    PERTINENT LAB DATA:                      PHYSICAL EXAMINATION:    Height (cm): 157.5  Weight (kg): 67.1  BMI (kg/m2): 27  BSA (m2): 1.68T(C): 36.8  HR: 107  BP: 134/84  RR: 17  SpO2: 100%    Constitutional: NAD  HEENT: PERRLA, EOMI,    Neck:  No JVD  Respiratory: CTAB/L  Cardiovascular: S1 and S2  Gastrointestinal: BS+, soft, NT/ND  Extremities: No peripheral edema  Neurological: A/O x 3, no focal deficits  Psychiatric: Normal mood, normal affect  Skin: No rashes    ASA Class: I [ ]  II [X ]  III [ ]  IV [ ]    COMMENTS:    The patient is a suitable candidate for the planned procedure unless box checked [ ]  No, explain:

## 2021-06-01 LAB — SURGICAL PATHOLOGY STUDY: SIGNIFICANT CHANGE UP

## 2021-06-07 ENCOUNTER — EMERGENCY (EMERGENCY)
Facility: HOSPITAL | Age: 36
LOS: 1 days | Discharge: HOME | End: 2021-06-07
Attending: PERSONAL EMERGENCY RESPONSE ATTENDANT | Admitting: PERSONAL EMERGENCY RESPONSE ATTENDANT
Payer: COMMERCIAL

## 2021-06-07 VITALS
TEMPERATURE: 98 F | RESPIRATION RATE: 16 BRPM | OXYGEN SATURATION: 100 % | HEART RATE: 92 BPM | DIASTOLIC BLOOD PRESSURE: 74 MMHG | SYSTOLIC BLOOD PRESSURE: 123 MMHG

## 2021-06-07 VITALS
OXYGEN SATURATION: 99 % | RESPIRATION RATE: 18 BRPM | DIASTOLIC BLOOD PRESSURE: 86 MMHG | HEIGHT: 62 IN | SYSTOLIC BLOOD PRESSURE: 142 MMHG | HEART RATE: 112 BPM | TEMPERATURE: 99 F

## 2021-06-07 DIAGNOSIS — Z98.890 OTHER SPECIFIED POSTPROCEDURAL STATES: Chronic | ICD-10-CM

## 2021-06-07 DIAGNOSIS — K08.409 PARTIAL LOSS OF TEETH, UNSPECIFIED CAUSE, UNSPECIFIED CLASS: Chronic | ICD-10-CM

## 2021-06-07 DIAGNOSIS — Z90.49 ACQUIRED ABSENCE OF OTHER SPECIFIED PARTS OF DIGESTIVE TRACT: Chronic | ICD-10-CM

## 2021-06-07 LAB
ALBUMIN SERPL ELPH-MCNC: 4.7 G/DL — SIGNIFICANT CHANGE UP (ref 3.3–5)
ALP SERPL-CCNC: 63 U/L — SIGNIFICANT CHANGE UP (ref 40–120)
ALT FLD-CCNC: 20 U/L — SIGNIFICANT CHANGE UP (ref 4–33)
ANION GAP SERPL CALC-SCNC: 15 MMOL/L — HIGH (ref 7–14)
APPEARANCE UR: SIGNIFICANT CHANGE UP
AST SERPL-CCNC: 17 U/L — SIGNIFICANT CHANGE UP (ref 4–32)
BASOPHILS # BLD AUTO: 0.07 K/UL — SIGNIFICANT CHANGE UP (ref 0–0.2)
BASOPHILS NFR BLD AUTO: 0.8 % — SIGNIFICANT CHANGE UP (ref 0–2)
BILIRUB SERPL-MCNC: 0.4 MG/DL — SIGNIFICANT CHANGE UP (ref 0.2–1.2)
BILIRUB UR-MCNC: NEGATIVE — SIGNIFICANT CHANGE UP
BLOOD GAS VENOUS COMPREHENSIVE RESULT: SIGNIFICANT CHANGE UP
BUN SERPL-MCNC: 15 MG/DL — SIGNIFICANT CHANGE UP (ref 7–23)
CALCIUM SERPL-MCNC: 9.7 MG/DL — SIGNIFICANT CHANGE UP (ref 8.4–10.5)
CHLORIDE SERPL-SCNC: 102 MMOL/L — SIGNIFICANT CHANGE UP (ref 98–107)
CO2 SERPL-SCNC: 26 MMOL/L — SIGNIFICANT CHANGE UP (ref 22–31)
COLOR SPEC: YELLOW — SIGNIFICANT CHANGE UP
CREAT SERPL-MCNC: 1.01 MG/DL — SIGNIFICANT CHANGE UP (ref 0.5–1.3)
DIFF PNL FLD: ABNORMAL
EOSINOPHIL # BLD AUTO: 0.02 K/UL — SIGNIFICANT CHANGE UP (ref 0–0.5)
EOSINOPHIL NFR BLD AUTO: 0.2 % — SIGNIFICANT CHANGE UP (ref 0–6)
EPI CELLS # UR: SIGNIFICANT CHANGE UP
GLUCOSE SERPL-MCNC: 86 MG/DL — SIGNIFICANT CHANGE UP (ref 70–99)
GLUCOSE UR QL: NEGATIVE — SIGNIFICANT CHANGE UP
HCT VFR BLD CALC: 37.7 % — SIGNIFICANT CHANGE UP (ref 34.5–45)
HGB BLD-MCNC: 12.8 G/DL — SIGNIFICANT CHANGE UP (ref 11.5–15.5)
IANC: 6.28 K/UL — SIGNIFICANT CHANGE UP (ref 1.5–8.5)
IMM GRANULOCYTES NFR BLD AUTO: 0.3 % — SIGNIFICANT CHANGE UP (ref 0–1.5)
KETONES UR-MCNC: ABNORMAL
LEUKOCYTE ESTERASE UR-ACNC: NEGATIVE — SIGNIFICANT CHANGE UP
LIDOCAIN IGE QN: 37 U/L — SIGNIFICANT CHANGE UP (ref 7–60)
LYMPHOCYTES # BLD AUTO: 1.66 K/UL — SIGNIFICANT CHANGE UP (ref 1–3.3)
LYMPHOCYTES # BLD AUTO: 19.3 % — SIGNIFICANT CHANGE UP (ref 13–44)
MCHC RBC-ENTMCNC: 31.2 PG — SIGNIFICANT CHANGE UP (ref 27–34)
MCHC RBC-ENTMCNC: 34 GM/DL — SIGNIFICANT CHANGE UP (ref 32–36)
MCV RBC AUTO: 92 FL — SIGNIFICANT CHANGE UP (ref 80–100)
MONOCYTES # BLD AUTO: 0.53 K/UL — SIGNIFICANT CHANGE UP (ref 0–0.9)
MONOCYTES NFR BLD AUTO: 6.2 % — SIGNIFICANT CHANGE UP (ref 2–14)
NEUTROPHILS # BLD AUTO: 6.28 K/UL — SIGNIFICANT CHANGE UP (ref 1.8–7.4)
NEUTROPHILS NFR BLD AUTO: 73.2 % — SIGNIFICANT CHANGE UP (ref 43–77)
NITRITE UR-MCNC: NEGATIVE — SIGNIFICANT CHANGE UP
NRBC # BLD: 0 /100 WBCS — SIGNIFICANT CHANGE UP
NRBC # FLD: 0 K/UL — SIGNIFICANT CHANGE UP
PH UR: 6.5 — SIGNIFICANT CHANGE UP (ref 5–8)
PLATELET # BLD AUTO: 398 K/UL — SIGNIFICANT CHANGE UP (ref 150–400)
POTASSIUM SERPL-MCNC: 3.3 MMOL/L — LOW (ref 3.5–5.3)
POTASSIUM SERPL-SCNC: 3.3 MMOL/L — LOW (ref 3.5–5.3)
PROT SERPL-MCNC: 7.6 G/DL — SIGNIFICANT CHANGE UP (ref 6–8.3)
PROT UR-MCNC: ABNORMAL
RBC # BLD: 4.1 M/UL — SIGNIFICANT CHANGE UP (ref 3.8–5.2)
RBC # FLD: 11.5 % — SIGNIFICANT CHANGE UP (ref 10.3–14.5)
SODIUM SERPL-SCNC: 143 MMOL/L — SIGNIFICANT CHANGE UP (ref 135–145)
SP GR SPEC: 1.03 — HIGH (ref 1.01–1.02)
UROBILINOGEN FLD QL: SIGNIFICANT CHANGE UP
WBC # BLD: 8.59 K/UL — SIGNIFICANT CHANGE UP (ref 3.8–10.5)
WBC # FLD AUTO: 8.59 K/UL — SIGNIFICANT CHANGE UP (ref 3.8–10.5)

## 2021-06-07 PROCEDURE — 99284 EMERGENCY DEPT VISIT MOD MDM: CPT

## 2021-06-07 PROCEDURE — 74177 CT ABD & PELVIS W/CONTRAST: CPT | Mod: 26

## 2021-06-07 RX ORDER — POTASSIUM CHLORIDE 20 MEQ
40 PACKET (EA) ORAL ONCE
Refills: 0 | Status: COMPLETED | OUTPATIENT
Start: 2021-06-07 | End: 2021-06-07

## 2021-06-07 RX ORDER — POTASSIUM CHLORIDE 20 MEQ
10 PACKET (EA) ORAL ONCE
Refills: 0 | Status: COMPLETED | OUTPATIENT
Start: 2021-06-07 | End: 2021-06-07

## 2021-06-07 RX ORDER — METOCLOPRAMIDE HCL 10 MG
1 TABLET ORAL
Qty: 28 | Refills: 0
Start: 2021-06-07 | End: 2021-06-13

## 2021-06-07 RX ORDER — POLYETHYLENE GLYCOL 3350 17 G/17G
1 POWDER, FOR SOLUTION ORAL
Qty: 0 | Refills: 0 | DISCHARGE

## 2021-06-07 RX ORDER — ZOLPIDEM TARTRATE 10 MG/1
1 TABLET ORAL
Qty: 0 | Refills: 0 | DISCHARGE

## 2021-06-07 RX ORDER — MAGNESIUM SULFATE 500 MG/ML
1 VIAL (ML) INJECTION ONCE
Refills: 0 | Status: COMPLETED | OUTPATIENT
Start: 2021-06-07 | End: 2021-06-07

## 2021-06-07 RX ORDER — SODIUM CHLORIDE 9 MG/ML
1000 INJECTION INTRAMUSCULAR; INTRAVENOUS; SUBCUTANEOUS ONCE
Refills: 0 | Status: COMPLETED | OUTPATIENT
Start: 2021-06-07 | End: 2021-06-07

## 2021-06-07 RX ORDER — DEXAMETHASONE 0.5 MG/5ML
10 ELIXIR ORAL ONCE
Refills: 0 | Status: COMPLETED | OUTPATIENT
Start: 2021-06-07 | End: 2021-06-07

## 2021-06-07 RX ORDER — LANOLIN ALCOHOL/MO/W.PET/CERES
1 CREAM (GRAM) TOPICAL
Qty: 0 | Refills: 0 | DISCHARGE

## 2021-06-07 RX ORDER — DIPHENHYDRAMINE HCL 50 MG
1 CAPSULE ORAL
Qty: 0 | Refills: 0 | DISCHARGE

## 2021-06-07 RX ORDER — METOCLOPRAMIDE HCL 10 MG
10 TABLET ORAL ONCE
Refills: 0 | Status: COMPLETED | OUTPATIENT
Start: 2021-06-07 | End: 2021-06-07

## 2021-06-07 RX ORDER — KETOROLAC TROMETHAMINE 30 MG/ML
15 SYRINGE (ML) INJECTION ONCE
Refills: 0 | Status: DISCONTINUED | OUTPATIENT
Start: 2021-06-07 | End: 2021-06-07

## 2021-06-07 RX ORDER — ONDANSETRON 8 MG/1
4 TABLET, FILM COATED ORAL ONCE
Refills: 0 | Status: COMPLETED | OUTPATIENT
Start: 2021-06-07 | End: 2021-06-07

## 2021-06-07 RX ADMIN — SODIUM CHLORIDE 1000 MILLILITER(S): 9 INJECTION INTRAMUSCULAR; INTRAVENOUS; SUBCUTANEOUS at 08:50

## 2021-06-07 RX ADMIN — Medication 100 MILLIEQUIVALENT(S): at 10:43

## 2021-06-07 RX ADMIN — Medication 102 MILLIGRAM(S): at 11:14

## 2021-06-07 RX ADMIN — Medication 15 MILLIGRAM(S): at 11:14

## 2021-06-07 RX ADMIN — Medication 1 MILLIGRAM(S): at 11:14

## 2021-06-07 RX ADMIN — SODIUM CHLORIDE 1000 MILLILITER(S): 9 INJECTION INTRAMUSCULAR; INTRAVENOUS; SUBCUTANEOUS at 11:14

## 2021-06-07 RX ADMIN — Medication 40 MILLIEQUIVALENT(S): at 10:17

## 2021-06-07 RX ADMIN — Medication 100 GRAM(S): at 12:07

## 2021-06-07 RX ADMIN — ONDANSETRON 4 MILLIGRAM(S): 8 TABLET, FILM COATED ORAL at 10:43

## 2021-06-07 RX ADMIN — Medication 10 MILLIGRAM(S): at 08:50

## 2021-06-07 NOTE — ED ADULT NURSE NOTE - OBJECTIVE STATEMENT
Pt received to intake area complaining of intermittent nausea since april stating it became worse on Saturday and was seen at [facility]. Pt states she then started feeling worse again yesterday and having chills. Pt also complaining of having migraines daily over the past few days. Pt denies chest pain, sob. IV access obtained, labs drawn and sent

## 2021-06-07 NOTE — ED ADULT NURSE NOTE - CHIEF COMPLAINT QUOTE
nausea    pt has had intermittent nausea since april... worse on satuday.. [facility].  no vomiting or diarrhea.  had endoscopy last week but did not receive official results yet.   chills last 2 days.   having daily migraines for 2 months... under the care of neurologist but ha persists had covid in December.

## 2021-06-07 NOTE — ED PROVIDER NOTE - CARE PROVIDER_API CALL
Rosario Leiva  NEUROLOGY  [address] [place] [address]  Phone: [phone]  Fax: [phone]  Established Patient  Follow Up Time:

## 2021-06-07 NOTE — ED PROVIDER NOTE - OBJECTIVE STATEMENT
Attending MD Almodovar.  Pt is a 37 yo female with no sig pmhx and remotely s/p cholecystectomy and hernia repair.  Pt had COVID in December of last year (now ~6 mos) and had recovered and been in her usual state of good health until April when she began to develop severe nausea, intermittent headaches, vague RUQ pain, early satiety and precipitous weight loss assoc with inability to tolerate PO.  She has lost ~15 lbs (likely >10% body weight) unintentionally and cannot tolerate PO.  Pt is in severe distress 2/2 persistent sxs that have not been dxed.  She has seen 2 neurologists (most recently been seeing Dr. Leiva through [employer]) and has been treated with trigger injections and migraine abortive medications.  Pt has also had MRI brain with and without during this time and had no actionable findings on that imaging.  She is s/p RUQ sono to eval CBD and did not have actionable findings.  Pt had endoscopy 9 days ago without actionable findings.  She is a lifetime non-smoker, denies EtOH, cannabis/other drug use.  Pt is not pregnant, has never been pregnant, discontinued OCP's in April as she was concerned they might be contributing to headaches.  Pt had no previous migraine hx prior to April.  She is now very distressed as she is entirely unable to tolerate PO.  She denies any assoc vertigo/slurred speech, focal numbness/weakness/tingling. Attending MD Almodovar.  Pt is a 35 yo female with no sig pmhx and remotely s/p cholecystectomy and hernia repair.  Pt had COVID in December of last year (now ~6 mos) and had recovered and been in her usual state of good health until April when she began to develop severe nausea, intermittent headaches, vague RUQ pain, early satiety and precipitous weight loss assoc with inability to tolerate PO.  She has lost ~15 lbs (likely >10% body weight) unintentionally and cannot tolerate PO.  Pt is in severe distress 2/2 persistent sxs that have not been dxed.  She has seen 2 neurologists (most recently been seeing Dr. Leiva through [employer]) and has been treated with trigger injections and migraine abortive medications.  Pt has also had MRI brain with and without during this time and had no actionable findings on that imaging.  She is s/p RUQ sono to eval CBD and did not have actionable findings.  Pt had endoscopy 9 days ago without actionable findings except for mild gastritis for which she's taken PPI which has not helped, no H. Pylori noted.  She is a lifetime non-smoker, denies EtOH, cannabis/other drug use.  Pt is not pregnant, has never been pregnant, discontinued OCP's in April as she was concerned they might be contributing to headaches.  Pt had no previous migraine hx prior to April.  She is now very distressed as she is entirely unable to tolerate PO.  She denies any assoc vertigo/slurred speech, focal numbness/weakness/tingling.

## 2021-06-07 NOTE — ED PROVIDER NOTE - CLINICAL SUMMARY MEDICAL DECISION MAKING FREE TEXT BOX
Attending MD Almodovar.  Pt with 2-3 mos nausea, headache, precipitous weight loss  (unintentional), vague intermittent RUQ/epigastric discomfort.

## 2021-06-07 NOTE — ED ADULT TRIAGE NOTE - CHIEF COMPLAINT QUOTE
nausea    pt has had intermittent nausea since april... worse on satuday.. [facility] [place]  no vomiting or diarrhea.  had endoscopy last week but did not receive official results yet.   chills last 2 days.   having daily migraines for 2 months... under the care of neurologist but ha persists had covid in December.

## 2021-06-07 NOTE — ED POST DISCHARGE NOTE - RESULT SUMMARY
rx reg;am dodmt gp tjri amd pl as [er dr ar,y tp semd pme dpse pf atovam 1 tpmogjt fpr axiety. All sent to [facility]

## 2021-06-07 NOTE — ED PROVIDER NOTE - PROGRESS NOTE DETAILS
Attending MD Almodovar.  Pt's labs, urine and CTAP are non-actionable at this visit.  She remains nauseas.  Has been started on amitryptiline and has not had relief.  2/2 pt ongoing sxs and recent care by Dr. Leiva, Dr. Leiva has been called to discuss care.  Pt has been on Ajovi since late April, usually like to give it 4-6 mos to see if will improve sxs but as pt is not having any improvement he is planning to readdress medications for ongoing migraine.  Will give pt copy of results for f/u purposes. Attending MD Almodovar.  Pt reassessed s/p reglan/saline/zofran and has been able to tolerate PO potassium and 1 cracker but remains nauseas.  She is becoming increasingly anxious about her sxs.  Prolonged discussion and reassurance re: non-actionable findings today and Dr. Leiva's plan to readdress migraine management as outpt.  Pt advised that she will be medicated additionally to help to control sxs but that she may need a better long-term control medication.  Plan to provide with results for f/u and refer to Dr. Leiva for expedited f/u.  Pt verbalizes understanding.  Will reassess shortly for improvement in sxs. Attending MD Almodovar.  Pt endorses resolution of sxs and comfort with discharge.  Pt aware that she should call her outpt neurologist for expedited appointment.  Results and note for work provided.  Return for new/worsening sxs or new sxs.

## 2021-06-07 NOTE — ED PROVIDER NOTE - PATIENT PORTAL LINK FT
You can access the FollowMyHealth Patient Portal offered by [facility] by registering at the following website: [email or web address]. By joining [employer]’s FollowMyHealth portal, you will also be able to view your health information using other applications (apps) compatible with our system.

## 2021-06-07 NOTE — ED PROVIDER NOTE - ADDITIONAL NOTES AND INSTRUCTIONS:
Please excuse Ms. Ashley from work on 6/7/2021 as she required emergency medical services.  She is cleared to return to work on or after 6/8/2021 as tolerated.    Thank you in advance for your understanding in this matter,    Dr. Kelsi Almodovar

## 2021-07-06 ENCOUNTER — APPOINTMENT (OUTPATIENT)
Dept: PULMONOLOGY | Facility: CLINIC | Age: 36
End: 2021-07-06
Payer: COMMERCIAL

## 2021-07-06 PROCEDURE — 99214 OFFICE O/P EST MOD 30 MIN: CPT | Mod: 95

## 2021-07-06 NOTE — REASON FOR VISIT
[Follow-Up] : a follow-up visit [Home] : at home, [unfilled] , at the time of the visit. [Medical Office: ([facility])___] : at the medical office located in  [Verbal consent obtained from patient] : the patient, [unfilled]

## 2021-07-06 NOTE — ASSESSMENT
[FreeTextEntry1] : 34yo F with history of chronic insomnia difficulty falling and staying asleep. She has tried OTC sleep aids in the past without much help. She will usually fall asleep within an hour but then wake up several times throughout the night. 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 Her sleep is significantly fragmented with 3-6 awakenings per night. If she were to sleep later, she still wakes up frequently. \par \par For her insomnia, she had been on zolpidem 5mg nightly but is now taking 10mg daily. It is no longer as effective as it once was and she is agreeable to trying out CBT for her chronic insomnia as an added therapeutic modality. \par \par Chronic insomnia\par Will continue ambien at current dose\par CBT: I explained the purpose of cognitive behavioral therapy for insomnia and the various cognitive and behavioral skills that the patient would learn, such as relaxation skills and behavioral condition to help with sleep onset.\par Referral to CBT provided

## 2021-07-06 NOTE — HISTORY OF PRESENT ILLNESS
[FreeTextEntry1] : 34yo F with history of chronic insomnia difficulty falling and staying asleep. She has tried OTC sleep aids in the past without much help. She will usually fall asleep within an hour but then wake up several times throughout the night. 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 Her sleep is significantly fragmented with 3-6 awakenings per night. If she were to sleep later, she still wakes up frequently. \par \par For her insomnia, she had been on zolpidem 5mg nightly but is now taking 10mg daily. It is no longer as effective as it once was and she is agreeable to trying out CBT for her chronic insomnia as an added therapeutic modality.

## 2021-07-07 DIAGNOSIS — R06.02 SHORTNESS OF BREATH: ICD-10-CM

## 2021-09-09 NOTE — ASU PATIENT PROFILE, ADULT - PRO ARRIVE FROM
09/09/21                            Toni Ramirez   [facility] [id number]    To Whom It May Concern:    This is to certify Toni Ramirez was evaluated with SUNNY Wesley on 09/09/21 and should be off work 9/8/2021-9/12/2021.       RESTRICTIONS: none              SUNNY Wesley  [facility]  [address] [facility] [address]  Phone: [phone]  Fax: [phone]     home

## 2021-09-24 NOTE — ASU PATIENT PROFILE, ADULT - NS PRO MODE OF ARRIVAL
ambulatory Cimzia Counseling:  I discussed with the patient the risks of Cimzia including but not limited to immunosuppression, allergic reactions and infections.  The patient understands that monitoring is required including a PPD at baseline and must alert us or the primary physician if symptoms of infection or other concerning signs are noted.

## 2021-11-19 ENCOUNTER — EMERGENCY (EMERGENCY)
Facility: HOSPITAL | Age: 36
LOS: 1 days | Discharge: HOME | End: 2021-11-19
Attending: STUDENT IN AN ORGANIZED HEALTH CARE EDUCATION/TRAINING PROGRAM | Admitting: STUDENT IN AN ORGANIZED HEALTH CARE EDUCATION/TRAINING PROGRAM
Payer: COMMERCIAL

## 2021-11-19 VITALS
RESPIRATION RATE: 17 BRPM | OXYGEN SATURATION: 97 % | TEMPERATURE: 98 F | SYSTOLIC BLOOD PRESSURE: 115 MMHG | HEART RATE: 58 BPM | DIASTOLIC BLOOD PRESSURE: 69 MMHG

## 2021-11-19 VITALS
DIASTOLIC BLOOD PRESSURE: 91 MMHG | TEMPERATURE: 98 F | HEART RATE: 60 BPM | HEIGHT: 62 IN | RESPIRATION RATE: 16 BRPM | OXYGEN SATURATION: 100 % | SYSTOLIC BLOOD PRESSURE: 134 MMHG

## 2021-11-19 DIAGNOSIS — Z98.890 OTHER SPECIFIED POSTPROCEDURAL STATES: Chronic | ICD-10-CM

## 2021-11-19 DIAGNOSIS — K08.409 PARTIAL LOSS OF TEETH, UNSPECIFIED CAUSE, UNSPECIFIED CLASS: Chronic | ICD-10-CM

## 2021-11-19 DIAGNOSIS — Z90.49 ACQUIRED ABSENCE OF OTHER SPECIFIED PARTS OF DIGESTIVE TRACT: Chronic | ICD-10-CM

## 2021-11-19 LAB
ALBUMIN SERPL ELPH-MCNC: 4.4 G/DL — SIGNIFICANT CHANGE UP (ref 3.3–5)
ALP SERPL-CCNC: 44 U/L — SIGNIFICANT CHANGE UP (ref 40–120)
ALT FLD-CCNC: 24 U/L — SIGNIFICANT CHANGE UP (ref 4–33)
ANION GAP SERPL CALC-SCNC: 12 MMOL/L — SIGNIFICANT CHANGE UP (ref 7–14)
AST SERPL-CCNC: 15 U/L — SIGNIFICANT CHANGE UP (ref 4–32)
BASOPHILS # BLD AUTO: 0.05 K/UL — SIGNIFICANT CHANGE UP (ref 0–0.2)
BASOPHILS NFR BLD AUTO: 0.3 % — SIGNIFICANT CHANGE UP (ref 0–2)
BILIRUB SERPL-MCNC: 0.5 MG/DL — SIGNIFICANT CHANGE UP (ref 0.2–1.2)
BUN SERPL-MCNC: 18 MG/DL — SIGNIFICANT CHANGE UP (ref 7–23)
CALCIUM SERPL-MCNC: 9.1 MG/DL — SIGNIFICANT CHANGE UP (ref 8.4–10.5)
CHLORIDE SERPL-SCNC: 100 MMOL/L — SIGNIFICANT CHANGE UP (ref 98–107)
CO2 SERPL-SCNC: 25 MMOL/L — SIGNIFICANT CHANGE UP (ref 22–31)
CREAT SERPL-MCNC: 0.65 MG/DL — SIGNIFICANT CHANGE UP (ref 0.5–1.3)
EOSINOPHIL # BLD AUTO: 0 K/UL — SIGNIFICANT CHANGE UP (ref 0–0.5)
EOSINOPHIL NFR BLD AUTO: 0 % — SIGNIFICANT CHANGE UP (ref 0–6)
GLUCOSE SERPL-MCNC: 120 MG/DL — HIGH (ref 70–99)
HCT VFR BLD CALC: 38.3 % — SIGNIFICANT CHANGE UP (ref 34.5–45)
HGB BLD-MCNC: 13 G/DL — SIGNIFICANT CHANGE UP (ref 11.5–15.5)
IANC: 11.45 K/UL — HIGH (ref 1.5–8.5)
IMM GRANULOCYTES NFR BLD AUTO: 2.8 % — HIGH (ref 0–1.5)
LYMPHOCYTES # BLD AUTO: 1.65 K/UL — SIGNIFICANT CHANGE UP (ref 1–3.3)
LYMPHOCYTES # BLD AUTO: 11.3 % — LOW (ref 13–44)
MCHC RBC-ENTMCNC: 31.5 PG — SIGNIFICANT CHANGE UP (ref 27–34)
MCHC RBC-ENTMCNC: 33.9 GM/DL — SIGNIFICANT CHANGE UP (ref 32–36)
MCV RBC AUTO: 92.7 FL — SIGNIFICANT CHANGE UP (ref 80–100)
MONOCYTES # BLD AUTO: 1.02 K/UL — HIGH (ref 0–0.9)
MONOCYTES NFR BLD AUTO: 7 % — SIGNIFICANT CHANGE UP (ref 2–14)
NEUTROPHILS # BLD AUTO: 11.45 K/UL — HIGH (ref 1.8–7.4)
NEUTROPHILS NFR BLD AUTO: 78.6 % — HIGH (ref 43–77)
NRBC # BLD: 0 /100 WBCS — SIGNIFICANT CHANGE UP
NRBC # FLD: 0 K/UL — SIGNIFICANT CHANGE UP
PLATELET # BLD AUTO: 416 K/UL — HIGH (ref 150–400)
POTASSIUM SERPL-MCNC: 4 MMOL/L — SIGNIFICANT CHANGE UP (ref 3.5–5.3)
POTASSIUM SERPL-SCNC: 4 MMOL/L — SIGNIFICANT CHANGE UP (ref 3.5–5.3)
PROT SERPL-MCNC: 6.9 G/DL — SIGNIFICANT CHANGE UP (ref 6–8.3)
RBC # BLD: 4.13 M/UL — SIGNIFICANT CHANGE UP (ref 3.8–5.2)
RBC # FLD: 12.4 % — SIGNIFICANT CHANGE UP (ref 10.3–14.5)
SODIUM SERPL-SCNC: 137 MMOL/L — SIGNIFICANT CHANGE UP (ref 135–145)
WBC # BLD: 14.58 K/UL — HIGH (ref 3.8–10.5)
WBC # FLD AUTO: 14.58 K/UL — HIGH (ref 3.8–10.5)

## 2021-11-19 PROCEDURE — 99285 EMERGENCY DEPT VISIT HI MDM: CPT

## 2021-11-19 PROCEDURE — 70496 CT ANGIOGRAPHY HEAD: CPT | Mod: 26,MA

## 2021-11-19 PROCEDURE — 70498 CT ANGIOGRAPHY NECK: CPT | Mod: 26,MA

## 2021-11-19 RX ORDER — METOCLOPRAMIDE HCL 10 MG
10 TABLET ORAL ONCE
Refills: 0 | Status: COMPLETED | OUTPATIENT
Start: 2021-11-19 | End: 2021-11-19

## 2021-11-19 RX ORDER — SODIUM CHLORIDE 9 MG/ML
1000 INJECTION INTRAMUSCULAR; INTRAVENOUS; SUBCUTANEOUS ONCE
Refills: 0 | Status: COMPLETED | OUTPATIENT
Start: 2021-11-19 | End: 2021-11-19

## 2021-11-19 RX ORDER — ACETAMINOPHEN 500 MG
1000 TABLET ORAL ONCE
Refills: 0 | Status: COMPLETED | OUTPATIENT
Start: 2021-11-19 | End: 2021-11-19

## 2021-11-19 RX ORDER — KETOROLAC TROMETHAMINE 30 MG/ML
15 SYRINGE (ML) INJECTION ONCE
Refills: 0 | Status: DISCONTINUED | OUTPATIENT
Start: 2021-11-19 | End: 2021-11-19

## 2021-11-19 RX ADMIN — Medication 15 MILLIGRAM(S): at 16:56

## 2021-11-19 RX ADMIN — Medication 400 MILLIGRAM(S): at 14:16

## 2021-11-19 RX ADMIN — Medication 10 MILLIGRAM(S): at 14:15

## 2021-11-19 RX ADMIN — SODIUM CHLORIDE 1000 MILLILITER(S): 9 INJECTION INTRAMUSCULAR; INTRAVENOUS; SUBCUTANEOUS at 14:16

## 2021-11-19 NOTE — ED PROVIDER NOTE - CLINICAL SUMMARY MEDICAL DECISION MAKING FREE TEXT BOX
37yo F with chronic headaches with acute headache today, worse and diff quality then her migraines, neuro intact, will get CTA

## 2021-11-19 NOTE — ED PROVIDER NOTE - PATIENT PORTAL LINK FT
You can access the FollowMyHealth Patient Portal offered by [facility] by registering at the following website: [email or web address]. By joining [employer]’s FollowMyHealth portal, you will also be able to view your health information using other applications (apps) compatible with our system.

## 2021-11-19 NOTE — ED PROVIDER NOTE - NSICDXFAMILYHX_GEN_ALL_CORE_FT
FAMILY HISTORY:  Mother  Still living? Yes, Estimated age: 51-60  Family history of arthritis, Age at diagnosis: Age Unknown  Family history of essential hypertension, Age at diagnosis: Age Unknown  Family history of gout, Age at diagnosis: Age Unknown  Family history of heart failure, Age at diagnosis: Age Unknown    Grandparent  Still living? Yes, Estimated age: 71-80  Family history of heart attack, Age at diagnosis: Age Unknown  Family history of melanoma, Age at diagnosis: Age Unknown

## 2021-11-19 NOTE — ED PROVIDER NOTE - NSICDXPASTSURGICALHX_GEN_ALL_CORE_FT
PAST SURGICAL HISTORY:  H/O umbilical hernia repair     History of cholecystectomy     S/P tooth extraction wisdom teeth removed 4

## 2021-11-19 NOTE — ED PROVIDER NOTE - PHYSICAL EXAMINATION
Gen: Well appearing in NAD  Head: NC/AT  Neck: trachea midline  Resp:  No distress  Ext: no deformities  Neuro:  A&O appears non focal, eomi, perrl, no facial droop, motor and sensory intact, no drift, normal gait   Skin:  Warm and dry as visualized  Psych:  Normal affect and mood

## 2021-11-19 NOTE — ED PROVIDER NOTE - OBJECTIVE STATEMENT
37yo F with "long haul covid" daily headaches since april, sees neurology dr denton, gets magnesium infusions, botox for headaches, saw her ID doctor and was started on decadron for headaches, now presents with worse headache today. started acutely on top of her head and radiating downwards more severe than prior headaches, now starting to improve without meds, was 10+ when started and now about 6. no assoc vomiting or vision changes. able ambulate wihtout issue. pt also states for las tweek has been feeling generalized weakness and   "not like herself"

## 2021-11-19 NOTE — ED ADULT NURSE NOTE - OBJECTIVE STATEMENT
Pt. presented to intake 10C. Pt. A&Ox4, ambulatory. Pt. c/o migrane, states after getting covid last year she has had migraines daily however today it got worse and her BP at home was elevates. Denies visual changes. Denies CP SOB, n/v/d, fever chills abd. soft/nontender, respirations even and unlabored.  RAC20, labs sent, medicated per order. Awaiting CT

## 2021-11-19 NOTE — ED PROVIDER NOTE - NSFOLLOWUPINSTRUCTIONS_ED_ALL_ED_FT
You were seen here for a headache that was worse than your chronic headaches. labs showed an elevated wbc which may have been do to steroids, ct angiogram was unremarkable.  Continue usual medications, May take ibuprofen 600 mg with tylenol 500 every 6-8 hours as needed for headache. Drink plenty of fluids.      Follow up with your neurologist in a few days.    Advance activity as tolerated.  Continue all previously prescribed medications as directed unless otherwise instructed.  Follow up with your primary care physician in 48-72 hours- bring copies of your results.  Return to the ER for worsening or persistent symptoms, and/or ANY NEW OR CONCERNING SYMPTOMS. If you have issues obtaining follow up, please call: [phone] to obtain a doctor or specialist who takes your insurance in your area.  You may call [phone] to make an appointment with the internal medicine clinic.

## 2022-04-12 ENCOUNTER — APPOINTMENT (OUTPATIENT)
Dept: PAIN MANAGEMENT | Facility: CLINIC | Age: 37
End: 2022-04-12
Payer: COMMERCIAL

## 2022-04-12 VITALS
HEART RATE: 103 BPM | DIASTOLIC BLOOD PRESSURE: 96 MMHG | HEIGHT: 62 IN | WEIGHT: 170 LBS | SYSTOLIC BLOOD PRESSURE: 143 MMHG | BODY MASS INDEX: 31.28 KG/M2

## 2022-04-12 PROCEDURE — 99204 OFFICE O/P NEW MOD 45 MIN: CPT

## 2022-04-13 ENCOUNTER — TRANSCRIPTION ENCOUNTER (OUTPATIENT)
Age: 37
End: 2022-04-13

## 2022-04-13 NOTE — HISTORY OF PRESENT ILLNESS
[FreeTextEntry1] : Ms. CHASE MARCANO is a 36 year RH female Pmhx HLD, Anxiety, who is here for initial evaluation of headaches .\par Onset of headaches was in December 2020 following Covid infection. During Covid infection she has constant daily headaches x 2 weeks, improved for 2-3 months and returned in April 2021.  Since then she has had constant daily headaches diffuse headache 5/10 associated with sens to light and noise, nausea, no vomiting, partially decreased with light.  She was seeing Dr. Rosario Leiva who retired and then saw Dr. Carlos .  She has failed multiple prophylactic meds and feels since starting Botox the intensity of her headaches has significantly decreased. \par \par She was having headaches 2-3x/month on average diffuse pain 2/10 lasting 1-2 hours associated with sens to light, no other associated sx responding well to Advil.  \par \par CHASE typically sleeps 5 hours per night. \par \par Failed Amitriptyline D/c ( HTN, chest pain, palpitations, weight gain), failed propranolol, Topamax (AE- brain fog)\par Current meds include: Qulipta ( started March 2022), Nurtec QOD,  Botox last End of march x 1 year,  Ajovy x 1 year, Nortriptyline 25 mg ( Jan 2021), Cymbalta 30 mg daily, Zolpidem 10 mg q hs, clonazepam 0.5 mg prn, Vyepti \par \par Family hx of Migraines in half brother. \par She is single and lives in [place].  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  She does not smoke or vape, denies etoh. Denies illicit drug use.  She was exercise regularly but stopped last year.

## 2022-04-13 NOTE — PHYSICAL EXAM
[FreeTextEntry1] : General appearance: Well nourished and with attention to grooming.No signs of distress. No signs of toxicity.\par Neck/spine: Examination of the cervical spine reveals normal range of motion in the neck, no midline tenderness, no paraspinal muscle tenderness, no facet tenderness. \par CV: RRR.\par Skin: No rash.\par Musculoskeletal: No joint deformities, no scoliosis, lordosis, kyphosis, pes cavus or hammer toes.\par Extremities: No peripheral edema.\par Neurological exam:\par Mental status: Patient is alert, attentive and oriented X3. Speech is coherent and fluent without dysarthria or aphasia. Affect normal.\par CN: Pupils were 5 mm and reactive to light. Extraocular movements were full. Visual fields are intact to confrontation. No nystagmus. There was no face, jaw, palate or tongue weakness or atrophy. Facial sensation was normal and symmetric. Hearing was grossly intact. Shoulder shrug was normal.\par Motor exam revealed normal bulk and tone. There is no evidence of atrophy or fasciculations. There is no pronator drift. Manual muscle testing revealed 5/5 strength throughout including neck flexion/extension and proximal and distal muscles of the arms and legs.\par Sensory exam demonstrated was normal to touch, pin, proprioception, and vibration in arms and legs. Romberg was negative.\par DTRs: 2+ b/l biceps, 2+ triceps, 2 + brachioradialis, 2+ patellar, and 2+ ankle reflexes. Plantar responses were flexor bilaterally. No clonus, Ko's or crossed adductor response.\par Coord: Normal finger to nose testing and rapid alternating movements.\par Gait: Normal gait. She is able to walk on heels, toes, and tandem without difficulty. \par

## 2022-04-28 RX ORDER — NORTRIPTYLINE HYDROCHLORIDE 10 MG/1
10 CAPSULE ORAL
Qty: 30 | Refills: 1 | Status: ACTIVE | COMMUNITY
Start: 2022-04-14 | End: 1900-01-01

## 2022-05-09 ENCOUNTER — RESULT REVIEW (OUTPATIENT)
Age: 37
End: 2022-05-09

## 2022-05-18 ENCOUNTER — OUTPATIENT (OUTPATIENT)
Dept: OUTPATIENT SERVICES | Facility: HOSPITAL | Age: 37
LOS: 1 days | End: 2022-05-18
Payer: COMMERCIAL

## 2022-05-18 ENCOUNTER — APPOINTMENT (OUTPATIENT)
Dept: ULTRASOUND IMAGING | Facility: IMAGING CENTER | Age: 37
End: 2022-05-18
Payer: COMMERCIAL

## 2022-05-18 ENCOUNTER — APPOINTMENT (OUTPATIENT)
Dept: MAMMOGRAPHY | Facility: IMAGING CENTER | Age: 37
End: 2022-05-18
Payer: COMMERCIAL

## 2022-05-18 DIAGNOSIS — Z98.890 OTHER SPECIFIED POSTPROCEDURAL STATES: Chronic | ICD-10-CM

## 2022-05-18 DIAGNOSIS — Z90.49 ACQUIRED ABSENCE OF OTHER SPECIFIED PARTS OF DIGESTIVE TRACT: Chronic | ICD-10-CM

## 2022-05-18 DIAGNOSIS — K08.409 PARTIAL LOSS OF TEETH, UNSPECIFIED CAUSE, UNSPECIFIED CLASS: Chronic | ICD-10-CM

## 2022-05-18 DIAGNOSIS — Z00.8 ENCOUNTER FOR OTHER GENERAL EXAMINATION: ICD-10-CM

## 2022-05-18 PROCEDURE — 77067 SCR MAMMO BI INCL CAD: CPT | Mod: 26

## 2022-05-18 PROCEDURE — 77063 BREAST TOMOSYNTHESIS BI: CPT | Mod: 26

## 2022-05-18 PROCEDURE — 76641 ULTRASOUND BREAST COMPLETE: CPT | Mod: 26,50

## 2022-05-18 PROCEDURE — 76641 ULTRASOUND BREAST COMPLETE: CPT

## 2022-05-18 PROCEDURE — 77067 SCR MAMMO BI INCL CAD: CPT

## 2022-05-18 PROCEDURE — 77063 BREAST TOMOSYNTHESIS BI: CPT

## 2022-05-20 ENCOUNTER — APPOINTMENT (OUTPATIENT)
Dept: PAIN MANAGEMENT | Facility: CLINIC | Age: 37
End: 2022-05-20

## 2022-05-24 ENCOUNTER — NON-APPOINTMENT (OUTPATIENT)
Age: 37
End: 2022-05-24

## 2022-06-22 ENCOUNTER — APPOINTMENT (OUTPATIENT)
Dept: PAIN MANAGEMENT | Facility: CLINIC | Age: 37
End: 2022-06-22

## 2022-07-04 NOTE — PACU DISCHARGE NOTE - PAIN:
Controlled with current regime
Abdomen soft, non-tender and non-distended, no rebound, no guarding and no masses. no hepatosplenomegaly.

## 2022-07-11 ENCOUNTER — APPOINTMENT (OUTPATIENT)
Dept: HUMAN REPRODUCTION | Facility: CLINIC | Age: 37
End: 2022-07-11

## 2022-07-11 PROCEDURE — 76830 TRANSVAGINAL US NON-OB: CPT

## 2022-07-11 PROCEDURE — 36415 COLL VENOUS BLD VENIPUNCTURE: CPT

## 2022-07-11 PROCEDURE — 99205 OFFICE O/P NEW HI 60 MIN: CPT | Mod: 25

## 2022-07-22 NOTE — PRE-ANESTHESIA EVALUATION ADULT - NSANTHGENDERRD_ENT_A_CORE
HPI obtained with assistance of Nathalia as patient is Afghan speaking    Ms. Giang is a 60 year old female patient whose current medical conditions include tobacco abuse, DM type II, liver steatosis, and dyslipidemia who presented to [facility] ED today with acute inferior STEMI. Patient complained of substernal chest heaviness that onset approximately 2 hours PTA and radiated to her right arm and back. Associated symptoms included SOB. No associated fever, chills, palpitations, near syncope, or syncope. EKG performed by EMS consistent with inferior STEMI and patient was taken emergently to cath lab for primary PCI. She received 325 mg ASA en route. Further rec's to follow.               No

## 2022-07-26 ENCOUNTER — APPOINTMENT (OUTPATIENT)
Dept: PULMONOLOGY | Facility: CLINIC | Age: 37
End: 2022-07-26

## 2022-07-26 DIAGNOSIS — F51.04 PSYCHOPHYSIOLOGIC INSOMNIA: ICD-10-CM

## 2022-07-26 PROCEDURE — 99213 OFFICE O/P EST LOW 20 MIN: CPT | Mod: 95

## 2022-07-26 NOTE — REASON FOR VISIT
[Home] : at home, [unfilled] , at the time of the visit. [Medical Office: ([facility])___] : at the medical office located in  [Patient] : the patient [Follow-Up] : a follow-up visit [FreeTextEntry1] : chronic insomnia

## 2022-07-26 NOTE — ASSESSMENT
[FreeTextEntry1] : 36yo F with history of chronic insomnia difficulty falling and staying asleep. She has tried OTC sleep aids in the past without much help. She will usually fall asleep within an hour but then wake up several times throughout the night. 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 Her sleep is significantly fragmented with 3-6 awakenings per night. If she were to sleep later, she still wakes up frequently. \par \par For her insomnia, she had been on zolpidem 5mg nightly but is now taking 10mg daily. This is helping her somewhat. \par \par Patient is deriving benefit from zolpidem 10mg \par Will continue for now\par Will provide refill

## 2022-07-26 NOTE — HISTORY OF PRESENT ILLNESS
[FreeTextEntry1] : 38yo F with history of chronic insomnia difficulty falling and staying asleep. She has tried OTC sleep aids in the past without much help. She will usually fall asleep within an hour but then wake up several times throughout the night. 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 Her sleep is significantly fragmented with 3-6 awakenings per night. If she were to sleep later, she still wakes up frequently. \par \par For her insomnia, she had been on zolpidem 5mg nightly but is now taking 10mg daily. This is helping her somewhat.

## 2022-08-14 ENCOUNTER — NON-APPOINTMENT (OUTPATIENT)
Age: 37
End: 2022-08-14

## 2022-08-15 ENCOUNTER — APPOINTMENT (OUTPATIENT)
Dept: HUMAN REPRODUCTION | Facility: CLINIC | Age: 37
End: 2022-08-15

## 2022-08-15 PROCEDURE — 99215 OFFICE O/P EST HI 40 MIN: CPT | Mod: 95

## 2022-10-03 DIAGNOSIS — G89.29 HEADACHE, UNSPECIFIED: ICD-10-CM

## 2022-10-03 DIAGNOSIS — U09.9 HEADACHE, UNSPECIFIED: ICD-10-CM

## 2022-10-03 DIAGNOSIS — R51.9 HEADACHE, UNSPECIFIED: ICD-10-CM

## 2022-10-04 ENCOUNTER — APPOINTMENT (OUTPATIENT)
Dept: HUMAN REPRODUCTION | Facility: CLINIC | Age: 37
End: 2022-10-04

## 2022-10-04 PROCEDURE — 36415 COLL VENOUS BLD VENIPUNCTURE: CPT

## 2022-10-05 ENCOUNTER — ASOB RESULT (OUTPATIENT)
Age: 37
End: 2022-10-05

## 2022-10-05 ENCOUNTER — APPOINTMENT (OUTPATIENT)
Dept: MATERNAL FETAL MEDICINE | Facility: CLINIC | Age: 37
End: 2022-10-05

## 2022-10-05 PROCEDURE — 99205 OFFICE O/P NEW HI 60 MIN: CPT | Mod: 95

## 2022-10-07 ENCOUNTER — APPOINTMENT (OUTPATIENT)
Dept: HUMAN REPRODUCTION | Facility: CLINIC | Age: 37
End: 2022-10-07

## 2022-10-07 PROCEDURE — 76831 ECHO EXAM UTERUS: CPT

## 2022-10-07 PROCEDURE — 58999I: CUSTOM

## 2022-10-07 PROCEDURE — 58340 CATHETER FOR HYSTEROGRAPHY: CPT

## 2022-10-07 PROCEDURE — 74740 X-RAY FEMALE GENITAL TRACT: CPT

## 2022-10-10 ENCOUNTER — TRANSCRIPTION ENCOUNTER (OUTPATIENT)
Age: 37
End: 2022-10-10

## 2022-10-12 ENCOUNTER — APPOINTMENT (OUTPATIENT)
Dept: MATERNAL FETAL MEDICINE | Facility: CLINIC | Age: 37
End: 2022-10-12

## 2022-10-20 ENCOUNTER — APPOINTMENT (OUTPATIENT)
Dept: HUMAN REPRODUCTION | Facility: CLINIC | Age: 37
End: 2022-10-20

## 2022-10-20 PROCEDURE — 76830 TRANSVAGINAL US NON-OB: CPT

## 2022-10-20 PROCEDURE — 99213 OFFICE O/P EST LOW 20 MIN: CPT | Mod: 25

## 2022-10-20 PROCEDURE — 36415 COLL VENOUS BLD VENIPUNCTURE: CPT

## 2022-10-28 ENCOUNTER — APPOINTMENT (OUTPATIENT)
Dept: HUMAN REPRODUCTION | Facility: CLINIC | Age: 37
End: 2022-10-28

## 2022-10-28 PROCEDURE — 36415 COLL VENOUS BLD VENIPUNCTURE: CPT

## 2022-10-28 PROCEDURE — 76830 TRANSVAGINAL US NON-OB: CPT

## 2022-10-28 PROCEDURE — 99213 OFFICE O/P EST LOW 20 MIN: CPT | Mod: 25

## 2022-10-31 ENCOUNTER — APPOINTMENT (OUTPATIENT)
Dept: HUMAN REPRODUCTION | Facility: CLINIC | Age: 37
End: 2022-10-31

## 2022-10-31 PROCEDURE — 36415 COLL VENOUS BLD VENIPUNCTURE: CPT

## 2022-10-31 PROCEDURE — 99213 OFFICE O/P EST LOW 20 MIN: CPT | Mod: 25

## 2022-10-31 PROCEDURE — 76830 TRANSVAGINAL US NON-OB: CPT

## 2022-11-03 ENCOUNTER — APPOINTMENT (OUTPATIENT)
Dept: HUMAN REPRODUCTION | Facility: CLINIC | Age: 37
End: 2022-11-03

## 2022-11-03 PROCEDURE — 76857 US EXAM PELVIC LIMITED: CPT

## 2022-11-03 PROCEDURE — 36415 COLL VENOUS BLD VENIPUNCTURE: CPT

## 2022-11-03 PROCEDURE — 99213 OFFICE O/P EST LOW 20 MIN: CPT | Mod: 25

## 2022-11-14 ENCOUNTER — APPOINTMENT (OUTPATIENT)
Dept: PAIN MANAGEMENT | Facility: CLINIC | Age: 37
End: 2022-11-14

## 2022-11-14 VITALS
WEIGHT: 158 LBS | SYSTOLIC BLOOD PRESSURE: 116 MMHG | HEIGHT: 62 IN | HEART RATE: 101 BPM | DIASTOLIC BLOOD PRESSURE: 82 MMHG | BODY MASS INDEX: 29.08 KG/M2 | RESPIRATION RATE: 16 BRPM

## 2022-11-14 PROCEDURE — 99214 OFFICE O/P EST MOD 30 MIN: CPT

## 2022-11-14 NOTE — ASSESSMENT
[FreeTextEntry1] : 38 y/o F with Pmhx chronic migraines now with new daily persistent headaches since April 2021. Non focal exam.\par Currently will be starting IVF, January 2023, CATHY said Botox and TPI, ONB ok, If needed can continue nortriptyline per OB \par \par continue Botox \par nortriptyline to 60mg q hs \par Verapamil \par mag ok per Ob \par hold Ubrelvy \par APAP prn \par \par

## 2022-11-14 NOTE — HISTORY OF PRESENT ILLNESS
[FreeTextEntry1] : 37 year RH female Pmhx HLD, Anxiety, chronic Migraines who presents today for follow up .  Persistent daily  headaches since December 2020 following Covid infection. Previously tx by Dr. Carlos and Dr. Rosario Leiva.  She is still having headaches occurring intermittent bitemporal and top of head 4/10 but up to 8/10 at least twice per week associated with sens to light and noise, nausea, no vomiting,\par \par  She has failed multiple prophylactic meds and feels since starting Botox the intensity of her headaches has significantly decreased. She has her last Botox tx on September 20,  Nortriptyline 60mg, Verapmil 180mg as well as TPI and ONB. \par \par CHASE typically sleeps 5 hours per night. \par \par Failed Amitriptyline D/c ( HTN, chest pain, palpitations, weight gain), failed propranolol, Topamax (AE- brain fog), Qulipta, Ajovy - failed AE rash, Vyepti- AE severe congestion , Nurtec QOD, Cymbalta 30 mg daily,\par Current meds include: Botox last September 20, Nortriptyline 60mg ( Jan 2021),  Zolpidem 10 mg q hs, clonazepam 0.5 mg prn, \par \par Family hx of Migraines in half brother. \par She is single and lives in [place]. 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 She does not smoke or vape, denies etoh. Denies illicit drug use. She was exercise regularly but stopped last year.

## 2022-11-28 ENCOUNTER — APPOINTMENT (OUTPATIENT)
Dept: HUMAN REPRODUCTION | Facility: CLINIC | Age: 37
End: 2022-11-28

## 2022-11-28 PROCEDURE — 36415 COLL VENOUS BLD VENIPUNCTURE: CPT

## 2022-11-28 PROCEDURE — 76857 US EXAM PELVIC LIMITED: CPT

## 2022-11-28 PROCEDURE — 99213 OFFICE O/P EST LOW 20 MIN: CPT | Mod: 25

## 2022-12-02 ENCOUNTER — APPOINTMENT (OUTPATIENT)
Dept: HUMAN REPRODUCTION | Facility: CLINIC | Age: 37
End: 2022-12-02

## 2022-12-02 PROCEDURE — 36415 COLL VENOUS BLD VENIPUNCTURE: CPT

## 2022-12-02 PROCEDURE — 76857 US EXAM PELVIC LIMITED: CPT

## 2022-12-02 PROCEDURE — 99213 OFFICE O/P EST LOW 20 MIN: CPT | Mod: 25

## 2022-12-05 ENCOUNTER — APPOINTMENT (OUTPATIENT)
Dept: HUMAN REPRODUCTION | Facility: CLINIC | Age: 37
End: 2022-12-05

## 2022-12-05 PROCEDURE — 99213 OFFICE O/P EST LOW 20 MIN: CPT | Mod: 25

## 2022-12-05 PROCEDURE — 36415 COLL VENOUS BLD VENIPUNCTURE: CPT

## 2022-12-05 PROCEDURE — 76830 TRANSVAGINAL US NON-OB: CPT

## 2022-12-07 ENCOUNTER — APPOINTMENT (OUTPATIENT)
Dept: HUMAN REPRODUCTION | Facility: CLINIC | Age: 37
End: 2022-12-07

## 2022-12-07 PROCEDURE — 36415 COLL VENOUS BLD VENIPUNCTURE: CPT

## 2022-12-07 PROCEDURE — 76857 US EXAM PELVIC LIMITED: CPT

## 2022-12-07 PROCEDURE — 99213 OFFICE O/P EST LOW 20 MIN: CPT | Mod: 25

## 2022-12-08 ENCOUNTER — APPOINTMENT (OUTPATIENT)
Dept: HUMAN REPRODUCTION | Facility: CLINIC | Age: 37
End: 2022-12-08

## 2022-12-08 PROCEDURE — 36415 COLL VENOUS BLD VENIPUNCTURE: CPT

## 2022-12-09 ENCOUNTER — APPOINTMENT (OUTPATIENT)
Dept: HUMAN REPRODUCTION | Facility: CLINIC | Age: 37
End: 2022-12-09

## 2022-12-09 PROCEDURE — 89250 CULTR OOCYTE/EMBRYO <4 DAYS: CPT

## 2022-12-09 PROCEDURE — 89254 OOCYTE IDENTIFICATION: CPT

## 2022-12-09 PROCEDURE — 76948 ECHO GUIDE OVA ASPIRATION: CPT

## 2022-12-09 PROCEDURE — 89261 SPERM ISOLATION COMPLEX: CPT

## 2022-12-09 PROCEDURE — 58970 RETRIEVAL OF OOCYTE: CPT

## 2022-12-09 PROCEDURE — 89280 ASSIST OOCYTE FERTILIZATION: CPT

## 2022-12-10 ENCOUNTER — APPOINTMENT (OUTPATIENT)
Dept: HUMAN REPRODUCTION | Facility: CLINIC | Age: 37
End: 2022-12-10

## 2022-12-10 PROCEDURE — 99213 OFFICE O/P EST LOW 20 MIN: CPT | Mod: 25

## 2022-12-10 PROCEDURE — 76857 US EXAM PELVIC LIMITED: CPT

## 2022-12-10 PROCEDURE — 36415 COLL VENOUS BLD VENIPUNCTURE: CPT

## 2022-12-12 ENCOUNTER — APPOINTMENT (OUTPATIENT)
Dept: HUMAN REPRODUCTION | Facility: CLINIC | Age: 37
End: 2022-12-12

## 2022-12-12 PROCEDURE — 36415 COLL VENOUS BLD VENIPUNCTURE: CPT

## 2022-12-12 PROCEDURE — 89253 EMBRYO HATCHING: CPT

## 2022-12-13 ENCOUNTER — APPOINTMENT (OUTPATIENT)
Dept: HUMAN REPRODUCTION | Facility: CLINIC | Age: 37
End: 2022-12-13

## 2022-12-14 ENCOUNTER — APPOINTMENT (OUTPATIENT)
Dept: HUMAN REPRODUCTION | Facility: CLINIC | Age: 37
End: 2022-12-14

## 2022-12-14 PROCEDURE — 58974 EMBRYO TRANSFER INTRAUTERINE: CPT

## 2022-12-14 PROCEDURE — 89398A: CUSTOM

## 2022-12-14 PROCEDURE — 89272 EXTENDED CULTURE OF OOCYTES: CPT

## 2022-12-14 PROCEDURE — 89255 PREPARE EMBRYO FOR TRANSFER: CPT

## 2022-12-14 PROCEDURE — 76998 US GUIDE INTRAOP: CPT

## 2022-12-15 ENCOUNTER — APPOINTMENT (OUTPATIENT)
Dept: HUMAN REPRODUCTION | Facility: CLINIC | Age: 37
End: 2022-12-15

## 2022-12-23 ENCOUNTER — APPOINTMENT (OUTPATIENT)
Dept: HUMAN REPRODUCTION | Facility: CLINIC | Age: 37
End: 2022-12-23

## 2022-12-23 PROCEDURE — 36415 COLL VENOUS BLD VENIPUNCTURE: CPT

## 2023-01-06 ENCOUNTER — APPOINTMENT (OUTPATIENT)
Dept: HUMAN REPRODUCTION | Facility: CLINIC | Age: 38
End: 2023-01-06
Payer: COMMERCIAL

## 2023-01-06 PROCEDURE — 36415 COLL VENOUS BLD VENIPUNCTURE: CPT

## 2023-02-17 ENCOUNTER — APPOINTMENT (OUTPATIENT)
Dept: HUMAN REPRODUCTION | Facility: CLINIC | Age: 38
End: 2023-02-17
Payer: COMMERCIAL

## 2023-02-17 ENCOUNTER — APPOINTMENT (OUTPATIENT)
Dept: HUMAN REPRODUCTION | Facility: CLINIC | Age: 38
End: 2023-02-17

## 2023-02-17 PROCEDURE — 99213 OFFICE O/P EST LOW 20 MIN: CPT | Mod: 25

## 2023-02-17 PROCEDURE — 76857 US EXAM PELVIC LIMITED: CPT

## 2023-02-17 PROCEDURE — 36415 COLL VENOUS BLD VENIPUNCTURE: CPT

## 2023-02-27 ENCOUNTER — APPOINTMENT (OUTPATIENT)
Dept: HUMAN REPRODUCTION | Facility: CLINIC | Age: 38
End: 2023-02-27
Payer: COMMERCIAL

## 2023-02-27 PROCEDURE — 36415 COLL VENOUS BLD VENIPUNCTURE: CPT

## 2023-02-27 PROCEDURE — 76857 US EXAM PELVIC LIMITED: CPT

## 2023-02-27 PROCEDURE — 99213 OFFICE O/P EST LOW 20 MIN: CPT | Mod: 25

## 2023-03-03 NOTE — ASSESSMENT
[FreeTextEntry1] : 35 y/o F with Pmhx chronic migraines now with new daily persistent headaches since April 2021. Non focal exam.\par \par \par Prior medical records including MRI brain and C spine  \par Will provide most recent testing performed with Dr. Carlos.\par D/c Ajovy and Nurtec \par continue Qulipta\par continue Botox \par continue Vyepti \par Start Ubrelvy 100 mg prn. \par increase nortriptyline to 35mg q hs \par \par \par CHASE MARCANO  was seen and examined with Dr. Phillips who agrees with the assessment and plan.\par \par \par \par \par 
Pt will have improved A1c via following diet and insulin regimen.

## 2023-03-07 NOTE — H&P PST ADULT - NSANTHTIREDRD_ENT_A_CORE
Patient discharged with v/s stable. Written and verbal after care instructions 
ABOUT ABD PAIN AND VOMITING given and explained. 

Patient alert, oriented and verbalized understanding of instructions. 
Ambulatory with steady gait. All questions addressed prior to discharge. ID 
band removed. Patient advised to follow up with PMD. Rx of BENTYL AND ZOFRAN 
given. Patient educated on indication of medication including possible reaction 
and side effects. Opportunity to ask questions provided and answered. pt to bed #5 1 week Yes

## 2023-03-08 ENCOUNTER — APPOINTMENT (OUTPATIENT)
Dept: HUMAN REPRODUCTION | Facility: CLINIC | Age: 38
End: 2023-03-08
Payer: COMMERCIAL

## 2023-03-08 PROCEDURE — 76857 US EXAM PELVIC LIMITED: CPT

## 2023-03-08 PROCEDURE — 36415 COLL VENOUS BLD VENIPUNCTURE: CPT

## 2023-03-08 PROCEDURE — 99213 OFFICE O/P EST LOW 20 MIN: CPT | Mod: 25

## 2023-03-17 ENCOUNTER — APPOINTMENT (OUTPATIENT)
Dept: HUMAN REPRODUCTION | Facility: CLINIC | Age: 38
End: 2023-03-17
Payer: COMMERCIAL

## 2023-03-17 PROCEDURE — 36415 COLL VENOUS BLD VENIPUNCTURE: CPT

## 2023-03-17 PROCEDURE — 99213 OFFICE O/P EST LOW 20 MIN: CPT | Mod: 25

## 2023-03-17 PROCEDURE — 76857 US EXAM PELVIC LIMITED: CPT

## 2023-03-19 ENCOUNTER — APPOINTMENT (OUTPATIENT)
Dept: HUMAN REPRODUCTION | Facility: CLINIC | Age: 38
End: 2023-03-19
Payer: COMMERCIAL

## 2023-03-19 PROCEDURE — 76857 US EXAM PELVIC LIMITED: CPT

## 2023-03-19 PROCEDURE — 36415 COLL VENOUS BLD VENIPUNCTURE: CPT

## 2023-03-19 PROCEDURE — 99213 OFFICE O/P EST LOW 20 MIN: CPT | Mod: 25

## 2023-03-20 ENCOUNTER — APPOINTMENT (OUTPATIENT)
Dept: PAIN MANAGEMENT | Facility: CLINIC | Age: 38
End: 2023-03-20

## 2023-03-23 ENCOUNTER — OFFICE (OUTPATIENT)
Dept: URBAN - METROPOLITAN AREA CLINIC 90 | Facility: CLINIC | Age: 38
Setting detail: OPHTHALMOLOGY
End: 2023-03-23
Payer: COMMERCIAL

## 2023-03-23 DIAGNOSIS — H16.223: ICD-10-CM

## 2023-03-23 DIAGNOSIS — G43.109: ICD-10-CM

## 2023-03-23 DIAGNOSIS — H57.02: ICD-10-CM

## 2023-03-23 PROCEDURE — 92012 INTRM OPH EXAM EST PATIENT: CPT | Performed by: OPHTHALMOLOGY

## 2023-03-23 ASSESSMENT — CONFRONTATIONAL VISUAL FIELD TEST (CVF)
OD_FINDINGS: FULL
OS_FINDINGS: FULL

## 2023-03-23 ASSESSMENT — SUPERFICIAL PUNCTATE KERATITIS (SPK)
OD_SPK: T
OS_SPK: T

## 2023-03-23 ASSESSMENT — KERATOMETRY
OD_AXISANGLE_DEGREES: 007
OD_K1POWER_DIOPTERS: 44.00
OD_K2POWER_DIOPTERS: 44.75
OS_K1POWER_DIOPTERS: UNABLE

## 2023-03-23 ASSESSMENT — REFRACTION_MANIFEST
OD_VA1: 20/20
OS_VA1: 20/20
OS_SPHERE: PLANO
OD_SPHERE: -0.50
OD_CYLINDER: SPHERE

## 2023-03-23 ASSESSMENT — REFRACTION_AUTOREFRACTION
OD_CYLINDER: -0.25
OS_SPHERE: 0.00
OS_CYLINDER: -0.25
OD_AXIS: 129
OD_SPHERE: -0.75
OS_AXIS: 062

## 2023-03-23 ASSESSMENT — VISUAL ACUITY
OS_BCVA: 20/20
OD_BCVA: 20/20

## 2023-03-23 ASSESSMENT — SPHEQUIV_DERIVED
OD_SPHEQUIV: -0.875
OS_SPHEQUIV: -0.125

## 2023-03-23 ASSESSMENT — AXIALLENGTH_DERIVED: OD_AL: 23.6114

## 2023-03-23 ASSESSMENT — TEAR BREAK UP TIME (TBUT)
OD_TBUT: T
OS_TBUT: T

## 2023-03-24 ENCOUNTER — APPOINTMENT (OUTPATIENT)
Dept: HUMAN REPRODUCTION | Facility: CLINIC | Age: 38
End: 2023-03-24
Payer: COMMERCIAL

## 2023-03-24 PROCEDURE — 76817 TRANSVAGINAL US OBSTETRIC: CPT

## 2023-03-24 PROCEDURE — 99213 OFFICE O/P EST LOW 20 MIN: CPT | Mod: 25

## 2023-03-24 PROCEDURE — 36415 COLL VENOUS BLD VENIPUNCTURE: CPT

## 2023-03-27 NOTE — ASU DISCHARGE PLAN (ADULT/PEDIATRIC). - ACCOMPANYING ADULT'S SIGNATURE_______________________________________
"Chief Complaint   Patient presents with     RECHECK     Chief Complaint   Patient presents with     RECHECK       Vitals:    03/27/23 1001   BP: 116/71   BP Location: Right arm   Patient Position: Sitting   Cuff Size: Adult Regular   Pulse: 65   Temp: 97  F (36.1  C)   TempSrc: Tympanic   SpO2: 100%   Weight: 127 lb 10.3 oz (57.9 kg)   Height: 5' 5.91\" (167.4 cm)       Drug: LMX 4 (Lidocaine 4%) Topical Anesthetic Cream  Patient weight: 57.9 kg (actual weight)    Patient MyChart Active? Yes  If no, would they like to sign up? N/A    Does patient need PHQ-2 completed today? Yes    Depression Response    Patient completed the PHQ-9 assessment for depression and scored >9? No  Question 9 on the PHQ-9 was positive for suicidality? No  Does patient have current mental health provider? Does not apply     I personally notified the following: clinic nurse     Chidi Zarco  March 27, 2023    Vitals:    03/27/23 1001   BP: 116/71   BP Location: Right arm   Patient Position: Sitting   Cuff Size: Adult Regular   Pulse: 65   Temp: 97  F (36.1  C)   TempSrc: Tympanic   SpO2: 100%   Weight: 127 lb 10.3 oz (57.9 kg)   Height: 5' 5.91\" (167.4 cm)       Chidi Zarco  March 27, 2023  " Statement Selected

## 2023-03-28 ENCOUNTER — APPOINTMENT (OUTPATIENT)
Dept: HUMAN REPRODUCTION | Facility: CLINIC | Age: 38
End: 2023-03-28
Payer: COMMERCIAL

## 2023-03-28 PROCEDURE — 36415 COLL VENOUS BLD VENIPUNCTURE: CPT

## 2023-03-28 PROCEDURE — 99213 OFFICE O/P EST LOW 20 MIN: CPT | Mod: 25

## 2023-03-28 PROCEDURE — 76817 TRANSVAGINAL US OBSTETRIC: CPT

## 2023-04-04 ENCOUNTER — APPOINTMENT (OUTPATIENT)
Dept: HUMAN REPRODUCTION | Facility: CLINIC | Age: 38
End: 2023-04-04
Payer: COMMERCIAL

## 2023-04-04 PROCEDURE — 36415 COLL VENOUS BLD VENIPUNCTURE: CPT

## 2023-04-04 PROCEDURE — 76817 TRANSVAGINAL US OBSTETRIC: CPT

## 2023-04-04 PROCEDURE — 99213 OFFICE O/P EST LOW 20 MIN: CPT | Mod: 25

## 2023-05-11 ENCOUNTER — ASOB RESULT (OUTPATIENT)
Age: 38
End: 2023-05-11

## 2023-05-11 ENCOUNTER — APPOINTMENT (OUTPATIENT)
Dept: ANTEPARTUM | Facility: CLINIC | Age: 38
End: 2023-05-11
Payer: COMMERCIAL

## 2023-05-11 PROCEDURE — 76813 OB US NUCHAL MEAS 1 GEST: CPT

## 2023-05-11 PROCEDURE — 76801 OB US < 14 WKS SINGLE FETUS: CPT

## 2023-05-17 ENCOUNTER — APPOINTMENT (OUTPATIENT)
Dept: MATERNAL FETAL MEDICINE | Facility: CLINIC | Age: 38
End: 2023-05-17
Payer: COMMERCIAL

## 2023-05-17 ENCOUNTER — ASOB RESULT (OUTPATIENT)
Age: 38
End: 2023-05-17

## 2023-05-17 PROCEDURE — 99204 OFFICE O/P NEW MOD 45 MIN: CPT | Mod: 95

## 2023-06-03 ENCOUNTER — OUTPATIENT (OUTPATIENT)
Dept: OUTPATIENT SERVICES | Facility: HOSPITAL | Age: 38
LOS: 1 days | End: 2023-06-03
Payer: COMMERCIAL

## 2023-06-03 ENCOUNTER — APPOINTMENT (OUTPATIENT)
Dept: MRI IMAGING | Facility: IMAGING CENTER | Age: 38
End: 2023-06-03
Payer: COMMERCIAL

## 2023-06-03 DIAGNOSIS — Z98.890 OTHER SPECIFIED POSTPROCEDURAL STATES: Chronic | ICD-10-CM

## 2023-06-03 DIAGNOSIS — K08.409 PARTIAL LOSS OF TEETH, UNSPECIFIED CAUSE, UNSPECIFIED CLASS: Chronic | ICD-10-CM

## 2023-06-03 DIAGNOSIS — Z90.49 ACQUIRED ABSENCE OF OTHER SPECIFIED PARTS OF DIGESTIVE TRACT: Chronic | ICD-10-CM

## 2023-06-03 DIAGNOSIS — R51.9 HEADACHE, UNSPECIFIED: ICD-10-CM

## 2023-06-03 PROCEDURE — 70551 MRI BRAIN STEM W/O DYE: CPT | Mod: 26

## 2023-06-03 PROCEDURE — 70551 MRI BRAIN STEM W/O DYE: CPT

## 2023-06-06 ENCOUNTER — ASOB RESULT (OUTPATIENT)
Age: 38
End: 2023-06-06

## 2023-06-06 ENCOUNTER — APPOINTMENT (OUTPATIENT)
Dept: ANTEPARTUM | Facility: CLINIC | Age: 38
End: 2023-06-06
Payer: COMMERCIAL

## 2023-06-06 PROCEDURE — 76805 OB US >/= 14 WKS SNGL FETUS: CPT

## 2023-06-07 ENCOUNTER — APPOINTMENT (OUTPATIENT)
Dept: NEUROLOGY | Facility: CLINIC | Age: 38
End: 2023-06-07
Payer: COMMERCIAL

## 2023-06-07 VITALS
HEART RATE: 112 BPM | SYSTOLIC BLOOD PRESSURE: 124 MMHG | HEIGHT: 62 IN | DIASTOLIC BLOOD PRESSURE: 87 MMHG | WEIGHT: 160 LBS | BODY MASS INDEX: 29.44 KG/M2

## 2023-06-07 VITALS
HEIGHT: 62 IN | SYSTOLIC BLOOD PRESSURE: 117 MMHG | BODY MASS INDEX: 29.44 KG/M2 | WEIGHT: 160 LBS | DIASTOLIC BLOOD PRESSURE: 78 MMHG | HEART RATE: 67 BPM

## 2023-06-07 DIAGNOSIS — Z82.3 FAMILY HISTORY OF STROKE: ICD-10-CM

## 2023-06-07 PROCEDURE — 99214 OFFICE O/P EST MOD 30 MIN: CPT

## 2023-06-07 RX ORDER — ZOLPIDEM TARTRATE 10 MG/1
10 TABLET ORAL
Qty: 30 | Refills: 1 | Status: DISCONTINUED | COMMUNITY
Start: 2021-01-20 | End: 2023-06-07

## 2023-06-07 RX ORDER — NORTRIPTYLINE HYDROCHLORIDE 50 MG/1
50 CAPSULE ORAL
Refills: 0 | Status: ACTIVE | COMMUNITY

## 2023-06-07 RX ORDER — ASPIRIN 81 MG
81 TABLET, DELAYED RELEASE (ENTERIC COATED) ORAL
Refills: 0 | Status: ACTIVE | COMMUNITY

## 2023-06-07 RX ORDER — CLONAZEPAM 0.5 MG/1
0.5 TABLET ORAL
Refills: 0 | Status: DISCONTINUED | COMMUNITY
End: 2023-06-07

## 2023-06-07 RX ORDER — VALACYCLOVIR HYDROCHLORIDE 500 MG/1
500 TABLET, FILM COATED ORAL
Refills: 0 | Status: ACTIVE | COMMUNITY

## 2023-06-07 NOTE — ASSESSMENT
[FreeTextEntry1] : This is a case of a 39 yo  right handed female with PMH of chronic migraine and insomnia, presents today with headaches. \par \par \par Plan: \par  { } F/U with High Risk OBGYN\par  { } continue with Tylenol as needed\par  { } Follow up with DAYTON Arita for evaluation on further treatment \par \par

## 2023-06-07 NOTE — REVIEW OF SYSTEMS
Wellness Screening Tool  Symptom Screening:  Do you have one of the following NEW symptoms:    Fever (subjective or >100.0)?  No    A new cough?  No    Shortness of breath?  No     Chills? No     New loss of taste or smell? No     Generalized body aches? No     New persistent headache? No     New sore throat? No     Nausea, vomiting, or diarrhea?  No    Within the past 2 weeks, have you been exposed to someone with a known positive illness below:    COVID-19 (known or suspected)?  No    Chicken pox?  No    Mealses?  No    Pertussis?  No    Patient notified of visitor policy- They may have one person accompany them to their appointment, but they will need to wear a mask and will be screened upon arrival for symptoms: Yes  Pt informed to wear a mask: Yes  Pt notified if they develop any symptoms listed above, prior to their appointment, they are to call the clinic directly at [phone] for further instructions.  Yes  Patient's appointment status: Patient will be seen in clinic as scheduled on 9/11/20. CMM,Rn         [As Noted in HPI] : as noted in HPI [Negative] : Heme/Lymph

## 2023-06-07 NOTE — PHYSICAL EXAM
[FreeTextEntry1] : Physical Exam\par \par Constitutional: Patient was well-developed, well-nourished and in no acute distress. \par \par Head: Normocephalic, atraumatic. Tympanic membranes were clear. \par \par Neck: Supple with full range of motion. \par \par Cardiovascular: Cardiac rhythm was regular without murmur. There were no carotid bruits. Peripheral pulses were full and symmetric. \par \par Respiratory: Lungs were clear. \par \par Abdomen: Soft and nontender. \par \par Spine: Nontender. \par \par Skin: There were no rashes. \par \par NEUROLOGICAL EXAMINATION:\par \par Mental Status: Patient was alert and oriented. Speech was fluent. There was no dysarthria. Affect was normal.\par \par Cranial Nerves: \par \par II: Visual acuity was 20/20 bilaterally with near card. Pupils were equal and reactive. Visual fields were full. \par \par III, IV, VI: Eye movements were full without nystagmus. \par \par V: Facial sensation was intact. \par \par VII: Facial strength was normal. \par \par VIII: Hearing was equal. \par \par IX, X: Palatal movement was normal. Phonation was normal. \par \par XI: Sternocleidomastoids and trapezii were normal. \par \par XII: Tongue was midline and movements normal. There was no lingual atrophy or fasciculations. \par \par Motor Examination: Muscle bulk, tone and strength were normal. \par \par Sensory Examination: Pinprick, vibration and joint position sense were intact. \par \par Reflexes: DTRs were 2+ throughout. \par \par Plantar Responses: Plantar responses were flexor. \par \par Coordination/Cerebellar Function: There was no dysmetria on finger to nose or heel to shin testing. \par \par Gait/Stance: Gait and tandem were normal. Romberg was negative.\par

## 2023-06-21 ENCOUNTER — APPOINTMENT (OUTPATIENT)
Dept: PAIN MANAGEMENT | Facility: CLINIC | Age: 38
End: 2023-06-21
Payer: COMMERCIAL

## 2023-06-21 VITALS
WEIGHT: 160 LBS | BODY MASS INDEX: 29.44 KG/M2 | SYSTOLIC BLOOD PRESSURE: 124 MMHG | DIASTOLIC BLOOD PRESSURE: 80 MMHG | HEIGHT: 62 IN | HEART RATE: 118 BPM

## 2023-06-21 PROCEDURE — 99214 OFFICE O/P EST MOD 30 MIN: CPT | Mod: 25

## 2023-06-21 PROCEDURE — 20552 NJX 1/MLT TRIGGER POINT 1/2: CPT

## 2023-06-21 PROCEDURE — 64405 NJX AA&/STRD GR OCPL NRV: CPT | Mod: 50,59

## 2023-06-21 NOTE — HISTORY OF PRESENT ILLNESS
[FreeTextEntry1] : 37 year RH female Pmhx HLD, Anxiety, chronic Migraines who presents today for follow up, 18 weeks pregnant and now with increased frequency and intensity of her headaches/Migraines.  Patient reports that she is under the care of high risk OB and was cleared to continue Nortriptyline and Botox tx as well as trigger points and ONB.  She initially  tapered off Nortriptyline within 2 weeks and was off x 2 weeks and restarted in April as headaches were occurring daily.  Currently back on Nortriptyline 70 mg q hs. Her headaches have worsened over the last few weeks.  She has had Migraine the last 4 days \par \par  Previously tx by Dr. Carlos and Dr. Rosario Leiva and transitioning care to us. \par \par Failed Amitriptyline D/c ( HTN, chest pain, palpitations, weight gain), failed propranolol, Topamax (AE- brain fog), Qulipta, Ajovy - failed AE rash, Vyepti- AE severe congestion , Nurtec QOD, Cymbalta 30 mg daily,\par Current meds include: Nortriptyline 70 mg q hs, Botox April 25th ( 1 month past due), ONB and Trigger points w/ Dr. Carlos q 6 weeks.  Diclegis ( Nausea and sleep), Cefaly, Ice, APAP prn \par \par \par Family hx of Migraines in half brother. \par She is single and lives in [place]. 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 She does not smoke or vape, denies etoh. Denies illicit drug use. She was exercise regularly but stopped last year.

## 2023-06-21 NOTE — ASSESSMENT
[FreeTextEntry1] : 39 y/o F pregnant at 18 weeks with Pmhx chronic migraines now with new daily persistent headaches since April 2021. \par \par -OB consult reviewed and appreciated. \par - B/L ONB and TPI B/L traps performed today without AE\par - continue Botox \par - continue nortriptyline to 70mg q hs \par -Consider APAP, caffeine as abortive , metoclopramide, Benadryl discussed with OB \par \par \par

## 2023-06-21 NOTE — PROCEDURE
[FreeTextEntry1] : The procedure risks, hazards and alternatives were discussed with the patient and appropriate consent was obtained. The patient's left occipital area was palpated to identify location of greater occipital nerve. Alcohol was applied topically to the skin. Using a 27 gauge needle (aspirating during insertion), 2.5 cc of a mixture of Kenalog mg, 1% lidocaine was injected on the LEFT side (directing needle to center, left and right of painful focus). Pressure with a gauze pad was held briefly upon the site of puncture to minimize bleeding and to further spread anaesthetic subcutaneously. The procedure was repeated on the right side, injecting a further 2.5 cc on that side. The patient tolerated procedure well without any apparent difficulties or complications. \par \par The procedure risks, hazards and alternatives were discussed with the patient and appropriate consent was obtained. The area over the myofascial spasm / pain was prepped with alcohol utilizing sterile technique. After isolating it between two palpating fingertips a 27 gauge 1.5” needle was placed in the center of the myofascial spasm and a negative aspiration was performed. A total of 5 mL of 1% Lidocaine mixed with Kenalog 40 mg was injected into 4 sites B/L traps. The patient tolerated procedure well without any apparent difficulties or complications. \par \par CHASE TERESA was monitored in the office for 10 minutes after injections and tolerated procedure well without adverse events.\par \par \par \par \par

## 2023-07-07 ENCOUNTER — ASOB RESULT (OUTPATIENT)
Age: 38
End: 2023-07-07

## 2023-07-07 ENCOUNTER — APPOINTMENT (OUTPATIENT)
Dept: ANTEPARTUM | Facility: CLINIC | Age: 38
End: 2023-07-07
Payer: COMMERCIAL

## 2023-07-07 ENCOUNTER — TRANSCRIPTION ENCOUNTER (OUTPATIENT)
Age: 38
End: 2023-07-07

## 2023-07-07 PROCEDURE — 76811 OB US DETAILED SNGL FETUS: CPT

## 2023-07-10 ENCOUNTER — TRANSCRIPTION ENCOUNTER (OUTPATIENT)
Age: 38
End: 2023-07-10

## 2023-07-21 ENCOUNTER — TRANSCRIPTION ENCOUNTER (OUTPATIENT)
Age: 38
End: 2023-07-21

## 2023-07-25 ENCOUNTER — NON-APPOINTMENT (OUTPATIENT)
Age: 38
End: 2023-07-25

## 2023-07-25 ENCOUNTER — APPOINTMENT (OUTPATIENT)
Dept: PAIN MANAGEMENT | Facility: CLINIC | Age: 38
End: 2023-07-25
Payer: COMMERCIAL

## 2023-07-25 VITALS
DIASTOLIC BLOOD PRESSURE: 82 MMHG | SYSTOLIC BLOOD PRESSURE: 118 MMHG | HEART RATE: 116 BPM | HEIGHT: 62 IN | WEIGHT: 171 LBS | BODY MASS INDEX: 31.47 KG/M2

## 2023-07-25 PROCEDURE — 64615 CHEMODENERV MUSC MIGRAINE: CPT

## 2023-07-25 PROCEDURE — 99214 OFFICE O/P EST MOD 30 MIN: CPT | Mod: 25

## 2023-07-25 NOTE — HISTORY OF PRESENT ILLNESS
[FreeTextEntry1] : 37 year RH female Pmhx HLD, Anxiety, chronic Migraines now 23 weeks pregnant who presents today for follow up.  Patient reports minimal transient benefit from ONB for a few days only typically lasts longer but neck tightness improved as well as decreased tightness in temples and jaw.  HA better controlled but still a few bad headaches in the last week.  Still on Nortriptyline 70 mg q hs. \par \par  Previously tx by Dr. Carlos and Dr. Rosario Leiva and transitioning care to us. \par \par Failed Amitriptyline D/c ( HTN, chest pain, palpitations, weight gain), failed propranolol, Topamax (AE- brain fog), Qulipta, Ajovy - failed AE rash, Vyepti- AE severe congestion , Nurtec QOD, Cymbalta 30 mg daily,\par Current meds include: Nortriptyline 70 mg q hs, Botox April 25th ( 1 month past due), ONB and Trigger points w/ Dr. Carlos q 6 weeks.  Diclegis ( Nausea and sleep), Cefaly, Ice, APAP prn \par \par \par Family hx of Migraines in half brother. \par She is single and lives in [place]. 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 Denies illicit drug use. She was exercise regularly but stopped last year.

## 2023-07-25 NOTE — PROCEDURE
[Adverse Effects] : no adverse effects [FreeTextEntry1] : 15 units B/L masseter \par 5 units B/L crow's feet \par 25 units wasted

## 2023-07-25 NOTE — ASSESSMENT
[FreeTextEntry1] : 39 y/o F pregnant at 23 weeks with Pmhx chronic migraines new daily persistent headaches since April 2021 responding well to Botox treatment as well as ONB/trigger points. \par \par Extensively discussed risks and benefits of migraine/headache treatment options in pregnancy including nonpharmacologic, OTC , prescription medications use as well as interventions such as occipital nerve blocks, trigger points and Botox treatment. We discussed that there is limited data on Botox exposure/use in pregnancy and patient understands risks and benefits and would like to continue Botox injections at this time. \par \par - Botox tx performed today without AE.  \par -  Plan to taper off nortriptyline gradually, currently on 70 mg q hs . Will decrease to 60 mg q hs starting today and will taper down by 1- mg q 10-12 days to avoid possibility of withdrawal of baby following delivery. \par - continue Botox \par -Consider APAP, caffeine as abortive , metoclopramide, Benadryl discussed with OB \par -fu for ONB and TPI as needed. \par \par

## 2023-07-31 ENCOUNTER — APPOINTMENT (OUTPATIENT)
Dept: PAIN MANAGEMENT | Facility: CLINIC | Age: 38
End: 2023-07-31
Payer: COMMERCIAL

## 2023-07-31 PROCEDURE — 64405 NJX AA&/STRD GR OCPL NRV: CPT | Mod: 50,79

## 2023-07-31 PROCEDURE — 20552 NJX 1/MLT TRIGGER POINT 1/2: CPT | Mod: 79

## 2023-07-31 PROCEDURE — 99214 OFFICE O/P EST MOD 30 MIN: CPT | Mod: 25

## 2023-07-31 NOTE — ASSESSMENT
[FreeTextEntry1] : 39 y/o F pregnant at 23 weeks with Pmhx chronic migraines new daily persistent headaches since April 2021 responding well to Botox treatment as well as ONB/trigger points.   Extensively discussed risks and benefits of migraine/headache treatment options in pregnancy including nonpharmacologic, OTC , prescription medications use as well as interventions such as occipital nerve blocks, trigger points and Botox treatment. We discussed that there is limited data on Botox exposure/use in pregnancy and patient understands risks and benefits and would like to continue Botox injections at this time.   - B/L ONB and TPI with lidocaine only performed today without AE  -  Plan to taper off nortriptyline gradually started on 70 mg, now on 60 mg q hs and will taper down by 10 mg q 10-12 days to avoid possibility of withdrawal of baby following delivery.  - continue Botox  -Consider APAP, caffeine as abortive , metoclopramide, Benadryl discussed with OB  -fu for ONB and TPI as needed.

## 2023-07-31 NOTE — PROCEDURE
[FreeTextEntry1] : The procedure risks, hazards and alternatives were discussed with the patient and appropriate consent was obtained. The patient's left occipital area was palpated to identify location of greater occipital nerve. Alcohol was applied topically to the skin. Using a 27 gauge needle (aspirating during insertion), 2 cc of a 1% lidocaine was injected on the LEFT side (directing needle to center, left and right of painful focus). Pressure with a gauze pad was held briefly upon the site of puncture to minimize bleeding and to further spread anaesthetic subcutaneously. The procedure was repeated on the right side, injecting a further 2 cc on that side. The patient tolerated procedure well without any apparent difficulties or complications.   The procedure risks, hazards and alternatives were discussed with the patient and appropriate consent was obtained. The area over the myofascial spasm / pain was prepped with alcohol utilizing sterile technique. After isolating it between two palpating fingertips a 27 gauge 1.5 needle was placed in the center of the myofascial spasm and a negative aspiration was performed. A total of 4 mL of 1% Lidocaine mixed with Kenalog 40 mg was injected into 2 sites. The patient tolerated procedure well without any apparent difficulties or complications.   CHASE TERESA was monitored in the office for 10 minutes after injections and tolerated procedure well without adverse events.

## 2023-07-31 NOTE — HISTORY OF PRESENT ILLNESS
[FreeTextEntry1] : 37 year RH female Pmhx HLD, Anxiety, chronic Migraines now 24 weeks pregnant who presents today for follow up.  Patient reports minimal transient benefit from ONB for a few days only typically lasts longer but neck tightness improved as well as decreased tightness in temples and jaw.  HA better controlled but still a few bad headaches in the last week.  Tapered Nortriptyline to 60 mg q hs without AE thus far.    Previously tx by Dr. Carlos and Dr. Rosario Leiva and transitioning care to us.   Failed Amitriptyline D/c ( HTN, chest pain, palpitations, weight gain), failed propranolol, Topamax (AE- brain fog), Qulipta, Ajovy - failed AE rash, Vyepti- AE severe congestion , Nurtec QOD, Cymbalta 30 mg daily, Current meds include: Nortriptyline 60 mg q hs, Botox April 25th ( 1 month past due), ONB and Trigger points w/ Dr. Carlos q 6 weeks.  Diclegis ( Nausea and sleep), Cefaly, Ice, APAP prn    Family hx of Migraines in half brother.  She is single and lives in [place]. 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 Denies illicit drug use. She was exercise regularly but stopped last year.

## 2023-08-24 ENCOUNTER — TRANSCRIPTION ENCOUNTER (OUTPATIENT)
Age: 38
End: 2023-08-24

## 2023-08-24 ENCOUNTER — APPOINTMENT (OUTPATIENT)
Dept: PAIN MANAGEMENT | Facility: CLINIC | Age: 38
End: 2023-08-24
Payer: COMMERCIAL

## 2023-08-24 VITALS
WEIGHT: 171 LBS | DIASTOLIC BLOOD PRESSURE: 85 MMHG | HEART RATE: 123 BPM | SYSTOLIC BLOOD PRESSURE: 127 MMHG | BODY MASS INDEX: 31.47 KG/M2 | HEIGHT: 62 IN

## 2023-08-24 PROCEDURE — 64405 NJX AA&/STRD GR OCPL NRV: CPT | Mod: 50

## 2023-08-24 PROCEDURE — 99213 OFFICE O/P EST LOW 20 MIN: CPT | Mod: 25

## 2023-08-24 NOTE — HISTORY OF PRESENT ILLNESS
[FreeTextEntry1] : 38 year RH female Pmhx HLD, Anxiety, chronic Migraines now 28 weeks pregnant who presents today for follow up.  Reports has been doing very well in regards to headaches the last 3-4 weeks following her B/L ONB and Botox tx.  She developed increased frequency of Migraines the last few days.  Tapered Nortriptyline to 40 mg q hs a few days ago.    Previously tx by Dr. Carlos and Dr. Rosario Leiva and transitioning care to us.   Failed Amitriptyline D/c ( HTN, chest pain, palpitations, weight gain), failed propranolol, Topamax (AE- brain fog), Qulipta, Ajovy - failed AE rash, Vyepti- AE severe congestion , Nurtec QOD, Cymbalta 30 mg daily, Current meds include: Nortriptyline 40 mg q hs, Botox April 25th ( 1 month past due), ONB and Trigger points w/ Dr. Carlos q 6 weeks.  Diclegis ( Nausea and sleep), Cefaly, Ice, APAP prn    Family hx of Migraines in half brother.  She is single and lives in [place]. 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 She does not smoke or vape, denies etoh. Denies illicit drug use. She was exercise regularly but stopped last year.

## 2023-08-24 NOTE — ASSESSMENT
[FreeTextEntry1] : 37 y/o F pregnant at 23 weeks with Pmhx chronic migraines new daily persistent headaches since April 2021 responding well to Botox treatment as well as ONB/trigger points.   Extensively discussed risks and benefits of migraine/headache treatment options in pregnancy including nonpharmacologic, OTC , prescription medications use as well as interventions such as occipital nerve blocks, trigger points and Botox treatment. We discussed that there is limited data on Botox exposure/use in pregnancy and patient understands risks and benefits and would like to continue Botox injections at this time.   - B/L ONB and TPI with lidocaine only performed today without AE  -  Plan to taper off nortriptyline gradually started on 70mg, now on 40 mg q hs and will taper down by 10 mg q 10-12 days to avoid possibility of withdrawal of baby following delivery.  - continue Botox  -Consider APAP, caffeine as abortive , metoclopramide, Benadryl discussed with OB  -fu for ONB and TPI as needed.

## 2023-08-24 NOTE — END OF VISIT
Yes, ok for her to drop of urine sample for UA and culture.    [Time Spent: ___ minutes] : I have spent [unfilled] minutes of time on the encounter.

## 2023-08-30 ENCOUNTER — APPOINTMENT (OUTPATIENT)
Dept: ANTEPARTUM | Facility: CLINIC | Age: 38
End: 2023-08-30
Payer: COMMERCIAL

## 2023-08-30 ENCOUNTER — ASOB RESULT (OUTPATIENT)
Age: 38
End: 2023-08-30

## 2023-08-30 PROCEDURE — 76816 OB US FOLLOW-UP PER FETUS: CPT

## 2023-09-25 ENCOUNTER — ASOB RESULT (OUTPATIENT)
Age: 38
End: 2023-09-25

## 2023-09-25 ENCOUNTER — APPOINTMENT (OUTPATIENT)
Dept: ANTEPARTUM | Facility: CLINIC | Age: 38
End: 2023-09-25
Payer: COMMERCIAL

## 2023-09-25 PROCEDURE — 76816 OB US FOLLOW-UP PER FETUS: CPT

## 2023-09-26 ENCOUNTER — APPOINTMENT (OUTPATIENT)
Dept: PAIN MANAGEMENT | Facility: CLINIC | Age: 38
End: 2023-09-26
Payer: COMMERCIAL

## 2023-09-26 VITALS
HEART RATE: 121 BPM | HEIGHT: 62 IN | WEIGHT: 171 LBS | BODY MASS INDEX: 31.47 KG/M2 | DIASTOLIC BLOOD PRESSURE: 85 MMHG | SYSTOLIC BLOOD PRESSURE: 128 MMHG

## 2023-09-26 PROCEDURE — 20553 NJX 1/MLT TRIGGER POINTS 3/>: CPT

## 2023-09-26 PROCEDURE — 99214 OFFICE O/P EST MOD 30 MIN: CPT | Mod: 25

## 2023-09-26 PROCEDURE — 64405 NJX AA&/STRD GR OCPL NRV: CPT | Mod: RT,LT

## 2023-10-02 ENCOUNTER — ASOB RESULT (OUTPATIENT)
Age: 38
End: 2023-10-02

## 2023-10-02 ENCOUNTER — APPOINTMENT (OUTPATIENT)
Dept: ANTEPARTUM | Facility: CLINIC | Age: 38
End: 2023-10-02
Payer: COMMERCIAL

## 2023-10-02 PROCEDURE — 76819 FETAL BIOPHYS PROFIL W/O NST: CPT

## 2023-10-13 ENCOUNTER — OUTPATIENT (OUTPATIENT)
Dept: OUTPATIENT SERVICES | Facility: HOSPITAL | Age: 38
LOS: 1 days | End: 2023-10-13
Payer: COMMERCIAL

## 2023-10-13 ENCOUNTER — APPOINTMENT (OUTPATIENT)
Dept: ANTEPARTUM | Facility: CLINIC | Age: 38
End: 2023-10-13
Payer: COMMERCIAL

## 2023-10-13 ENCOUNTER — ASOB RESULT (OUTPATIENT)
Age: 38
End: 2023-10-13

## 2023-10-13 VITALS — OXYGEN SATURATION: 100 % | HEART RATE: 123 BPM

## 2023-10-13 DIAGNOSIS — Z90.49 ACQUIRED ABSENCE OF OTHER SPECIFIED PARTS OF DIGESTIVE TRACT: Chronic | ICD-10-CM

## 2023-10-13 DIAGNOSIS — O26.899 OTHER SPECIFIED PREGNANCY RELATED CONDITIONS, UNSPECIFIED TRIMESTER: ICD-10-CM

## 2023-10-13 DIAGNOSIS — Z98.890 OTHER SPECIFIED POSTPROCEDURAL STATES: Chronic | ICD-10-CM

## 2023-10-13 LAB
ALBUMIN SERPL ELPH-MCNC: 3.6 G/DL — SIGNIFICANT CHANGE UP (ref 3.3–5)
ALP SERPL-CCNC: 121 U/L — HIGH (ref 40–120)
ALT FLD-CCNC: 13 U/L — SIGNIFICANT CHANGE UP (ref 10–45)
ANION GAP SERPL CALC-SCNC: 15 MMOL/L — SIGNIFICANT CHANGE UP (ref 5–17)
APPEARANCE UR: CLEAR — SIGNIFICANT CHANGE UP
APTT BLD: 25.6 SEC — SIGNIFICANT CHANGE UP (ref 24.5–35.6)
AST SERPL-CCNC: 17 U/L — SIGNIFICANT CHANGE UP (ref 10–40)
BASOPHILS # BLD AUTO: 0.03 K/UL — SIGNIFICANT CHANGE UP (ref 0–0.2)
BASOPHILS NFR BLD AUTO: 0.3 % — SIGNIFICANT CHANGE UP (ref 0–2)
BILIRUB SERPL-MCNC: 0.2 MG/DL — SIGNIFICANT CHANGE UP (ref 0.2–1.2)
BILIRUB UR-MCNC: NEGATIVE — SIGNIFICANT CHANGE UP
BLD GP AB SCN SERPL QL: NEGATIVE — SIGNIFICANT CHANGE UP
BUN SERPL-MCNC: 6 MG/DL — LOW (ref 7–23)
CALCIUM SERPL-MCNC: 9.5 MG/DL — SIGNIFICANT CHANGE UP (ref 8.4–10.5)
CHLORIDE SERPL-SCNC: 105 MMOL/L — SIGNIFICANT CHANGE UP (ref 96–108)
CO2 SERPL-SCNC: 21 MMOL/L — LOW (ref 22–31)
COLOR SPEC: SIGNIFICANT CHANGE UP
CREAT SERPL-MCNC: 0.55 MG/DL — SIGNIFICANT CHANGE UP (ref 0.5–1.3)
DIFF PNL FLD: NEGATIVE — SIGNIFICANT CHANGE UP
EGFR: 122 ML/MIN/1.73M2 — SIGNIFICANT CHANGE UP
EOSINOPHIL # BLD AUTO: 0.02 K/UL — SIGNIFICANT CHANGE UP (ref 0–0.5)
EOSINOPHIL NFR BLD AUTO: 0.2 % — SIGNIFICANT CHANGE UP (ref 0–6)
FIBRINOGEN PPP-MCNC: 644 MG/DL — HIGH (ref 200–445)
GLUCOSE SERPL-MCNC: 96 MG/DL — SIGNIFICANT CHANGE UP (ref 70–99)
GLUCOSE UR QL: NEGATIVE — SIGNIFICANT CHANGE UP
HCT VFR BLD CALC: 32.9 % — LOW (ref 34.5–45)
HGB BLD-MCNC: 11.4 G/DL — LOW (ref 11.5–15.5)
IMM GRANULOCYTES NFR BLD AUTO: 0.5 % — SIGNIFICANT CHANGE UP (ref 0–0.9)
INR BLD: 0.9 RATIO — SIGNIFICANT CHANGE UP (ref 0.85–1.18)
KETONES UR-MCNC: ABNORMAL
LDH SERPL L TO P-CCNC: 177 U/L — SIGNIFICANT CHANGE UP (ref 50–242)
LEUKOCYTE ESTERASE UR-ACNC: NEGATIVE — SIGNIFICANT CHANGE UP
LYMPHOCYTES # BLD AUTO: 2.38 K/UL — SIGNIFICANT CHANGE UP (ref 1–3.3)
LYMPHOCYTES # BLD AUTO: 23.9 % — SIGNIFICANT CHANGE UP (ref 13–44)
MCHC RBC-ENTMCNC: 31.1 PG — SIGNIFICANT CHANGE UP (ref 27–34)
MCHC RBC-ENTMCNC: 34.7 GM/DL — SIGNIFICANT CHANGE UP (ref 32–36)
MCV RBC AUTO: 89.6 FL — SIGNIFICANT CHANGE UP (ref 80–100)
MONOCYTES # BLD AUTO: 0.58 K/UL — SIGNIFICANT CHANGE UP (ref 0–0.9)
MONOCYTES NFR BLD AUTO: 5.8 % — SIGNIFICANT CHANGE UP (ref 2–14)
NEUTROPHILS # BLD AUTO: 6.89 K/UL — SIGNIFICANT CHANGE UP (ref 1.8–7.4)
NEUTROPHILS NFR BLD AUTO: 69.3 % — SIGNIFICANT CHANGE UP (ref 43–77)
NITRITE UR-MCNC: NEGATIVE — SIGNIFICANT CHANGE UP
NRBC # BLD: 0 /100 WBCS — SIGNIFICANT CHANGE UP (ref 0–0)
PH UR: 6.5 — SIGNIFICANT CHANGE UP (ref 5–8)
PLATELET # BLD AUTO: 319 K/UL — SIGNIFICANT CHANGE UP (ref 150–400)
POTASSIUM SERPL-MCNC: 3.6 MMOL/L — SIGNIFICANT CHANGE UP (ref 3.5–5.3)
POTASSIUM SERPL-SCNC: 3.6 MMOL/L — SIGNIFICANT CHANGE UP (ref 3.5–5.3)
PROT SERPL-MCNC: 6.6 G/DL — SIGNIFICANT CHANGE UP (ref 6–8.3)
PROT UR-MCNC: NEGATIVE — SIGNIFICANT CHANGE UP
PROTHROM AB SERPL-ACNC: 9.9 SEC — SIGNIFICANT CHANGE UP (ref 9.5–13)
RBC # BLD: 3.67 M/UL — LOW (ref 3.8–5.2)
RBC # FLD: 12.2 % — SIGNIFICANT CHANGE UP (ref 10.3–14.5)
RH IG SCN BLD-IMP: POSITIVE — SIGNIFICANT CHANGE UP
RH IG SCN BLD-IMP: POSITIVE — SIGNIFICANT CHANGE UP
SODIUM SERPL-SCNC: 141 MMOL/L — SIGNIFICANT CHANGE UP (ref 135–145)
SP GR SPEC: 1 — LOW (ref 1.01–1.02)
URATE SERPL-MCNC: 4.4 MG/DL — SIGNIFICANT CHANGE UP (ref 2.5–7)
UROBILINOGEN FLD QL: NEGATIVE — SIGNIFICANT CHANGE UP
WBC # BLD: 9.95 K/UL — SIGNIFICANT CHANGE UP (ref 3.8–10.5)
WBC # FLD AUTO: 9.95 K/UL — SIGNIFICANT CHANGE UP (ref 3.8–10.5)

## 2023-10-13 PROCEDURE — 76816 OB US FOLLOW-UP PER FETUS: CPT

## 2023-10-13 PROCEDURE — 76819 FETAL BIOPHYS PROFIL W/O NST: CPT

## 2023-10-13 RX ORDER — SODIUM CHLORIDE 9 MG/ML
500 INJECTION, SOLUTION INTRAVENOUS ONCE
Refills: 0 | Status: COMPLETED | OUTPATIENT
Start: 2023-10-13 | End: 2023-10-13

## 2023-10-13 RX ORDER — SODIUM CHLORIDE 9 MG/ML
1000 INJECTION, SOLUTION INTRAVENOUS
Refills: 0 | Status: DISCONTINUED | OUTPATIENT
Start: 2023-10-13 | End: 2023-10-28

## 2023-10-13 RX ORDER — LANOLIN ALCOHOL/MO/W.PET/CERES
10 CREAM (GRAM) TOPICAL AT BEDTIME
Refills: 0 | Status: DISCONTINUED | OUTPATIENT
Start: 2023-10-13 | End: 2023-10-28

## 2023-10-13 RX ORDER — NORTRIPTYLINE HYDROCHLORIDE 10 MG/1
30 CAPSULE ORAL AT BEDTIME
Refills: 0 | Status: DISCONTINUED | OUTPATIENT
Start: 2023-10-13 | End: 2023-10-28

## 2023-10-13 RX ORDER — POLYETHYLENE GLYCOL 3350 17 G/17G
17 POWDER, FOR SOLUTION ORAL AT BEDTIME
Refills: 0 | Status: DISCONTINUED | OUTPATIENT
Start: 2023-10-13 | End: 2023-10-28

## 2023-10-13 RX ADMIN — POLYETHYLENE GLYCOL 3350 17 GRAM(S): 17 POWDER, FOR SOLUTION ORAL at 21:30

## 2023-10-13 RX ADMIN — NORTRIPTYLINE HYDROCHLORIDE 30 MILLIGRAM(S): 10 CAPSULE ORAL at 21:31

## 2023-10-13 RX ADMIN — Medication 10 MILLIGRAM(S): at 23:38

## 2023-10-13 RX ADMIN — SODIUM CHLORIDE 1000 MILLILITER(S): 9 INJECTION, SOLUTION INTRAVENOUS at 18:25

## 2023-10-13 RX ADMIN — SODIUM CHLORIDE 125 MILLILITER(S): 9 INJECTION, SOLUTION INTRAVENOUS at 18:40

## 2023-10-13 NOTE — OB PROVIDER TRIAGE NOTE - NSHPPHYSICALEXAM_GEN_ALL_CORE
T(C): 36.8 (10-13-23 @ 17:26), Max: 37 (10-13-23 @ 16:29)  HR: 116 (10-13-23 @ 18:00) (116 - 134)  BP: 130/84 (10-13-23 @ 17:26) (130/84 - 148/93)  RR: 18 (10-13-23 @ 17:26) (18 - 18)  SpO2: 100% (10-13-23 @ 18:00) (95% - 100%)  GEN:NAD  CV:RRR  Pulm: CTA bl  Abd soft NT gravid  FHT:    150 , mod tyrone, + accels, - decels   TOCO:  irritability  SSE no pooling, fern negative, nitrazine negative  VE: 0.5/long/-3/posterior  SONO

## 2023-10-13 NOTE — OB PROVIDER TRIAGE NOTE - NSOBPROVIDERNOTE_OBGYN_ALL_OB_FT
AP 39yo  @ 34w3d with ROBERT 3.49, reassuring FHT  -ruled out for rupture  -EFM/TOCO  -IV hydration  -regular diet  -repeat BPP in AM  DW Dr Russell abdul PAC AP 39yo  @ 34w3d with ROBERT 3.49, reassuring FHT  -ruled out for rupture  -EFM/TOCO  -IV hydration  -regular diet  -repeat BPP in AM  DW Dr Russell abdul [facility]    -----------------------------------  Addendum:  -repeat BPP performed, , vtx, MVP 2.51, left lateral placenta  -NST reactive baseline 135, moderate variability, +accels, -decels  -plan for repeat BPP at [facility] on Monday  -consider BMZ for FLM in setting of persistent low fluid    Nash, PGY4  D/w Dr. Wells AP 37yo  @ 34w3d with ROBERT 3.49, reassuring FHT  -ruled out for rupture  -EFM/TOCO  -IV hydration  -regular diet  -repeat BPP in AM  DW Dr Russell abdul [facility]    -----------------------------------  Addendum:  -repeat BPP performed, , vtx, MVP 2.51, left lateral placenta  -NST reactive baseline 135, moderate variability, +accels, -decels  -plan for repeat BPP at [facility] on Monday    Nash, PGY4  D/w Dr. Wells

## 2023-10-13 NOTE — OB RN TRIAGE NOTE - FALL HARM RISK - UNIVERSAL INTERVENTIONS
Bed in lowest position, wheels locked, appropriate side rails in place/Call bell, personal items and telephone in reach/Instruct patient to call for assistance before getting out of bed or chair/Non-slip footwear when patient is out of bed/[place] to call system/Physically safe environment - no spills, clutter or unnecessary equipment/Purposeful Proactive Rounding/Room/bathroom lighting operational, light cord in reach

## 2023-10-13 NOTE — OB PROVIDER TRIAGE NOTE - HISTORY OF PRESENT ILLNESS
37yo  EDC 23 @ 34w2d sent from [facility] due to oligohydramnios. Pt has been followed for low normal fluid. Today ROBERT was 3.5. Pt denies LOF, CTX, VB. +FM  GBS  unknown  EFW 2511  PNC c/b oligohydramnios  SONO 10/13: robert 3.49, BPP 8/8, efw 2511, 5#9,  59%    OB:   GYN: h/o abnl pap, nl colpo, h/o HSV (remote h/o outbreaks); Denies fibroids/ovarian cysts  PMH: Long Covid, Migraines( treated with Nortriptiline, Botox, Trigger point injections), Sinus Tachycardia (negative cards work up )  PSH: LSC Cholecystectomy/Umbilical hernia repair, [place] Teeth  MEDS: PNV, Valtrex 500mg QD, Nortriptiline 30mg qhs, Vit D, Diclegis, Melatonin, Miralax  ALL: Macrobid (rash)  Psych: +Anxiety  Accepts blood.  37yo  EDC 23 @ 34w2d sent from [facility] due to oligohydramnios. Pt has been followed for low normal fluid. Today ROBERT was 3.5. Pt denies LOF, CTX, VB. +FM  GBS  unknown  EFW 2511  PNC c/b oligohydramnios  SONO 10/13: robert 3.49, BPP 8/8, efw 2511, 5#9,  59%    OB:   GYN: h/o abnl pap, nl colpo, h/o HSV (remote h/o outbreaks); Denies fibroids/ovarian cysts  PMH: Long Covid, Migraines( treated with Nortriptiline, Botox, Trigger point injections), Sinus Tachycardia (negative cards work up )  PSH: LSC Cholecystectomy/Umbilical hernia repair, [place] Teeth  MEDS: PNV, Valtrex 500mg QD, Nortriptiline 30mg qhs, Vit D, Diclegis, Melatonin, Miralax  ALL: Macrobid (rash)  Psych: +Anxiety  Accepts blood.

## 2023-10-14 VITALS
DIASTOLIC BLOOD PRESSURE: 77 MMHG | HEART RATE: 110 BPM | OXYGEN SATURATION: 100 % | SYSTOLIC BLOOD PRESSURE: 128 MMHG | TEMPERATURE: 98 F

## 2023-10-14 LAB
CREAT ?TM UR-MCNC: 28 MG/DL — SIGNIFICANT CHANGE UP
PROT ?TM UR-MCNC: <7 MG/DL — SIGNIFICANT CHANGE UP (ref 0–12)
PROT/CREAT UR-RTO: <0.2 RATIO — SIGNIFICANT CHANGE UP (ref 0–0.2)

## 2023-10-16 ENCOUNTER — APPOINTMENT (OUTPATIENT)
Dept: ANTEPARTUM | Facility: CLINIC | Age: 38
End: 2023-10-16
Payer: COMMERCIAL

## 2023-10-16 ENCOUNTER — ASOB RESULT (OUTPATIENT)
Age: 38
End: 2023-10-16

## 2023-10-16 PROCEDURE — 76819 FETAL BIOPHYS PROFIL W/O NST: CPT

## 2023-10-17 DIAGNOSIS — G43.909 MIGRAINE, UNSPECIFIED, NOT INTRACTABLE, WITHOUT STATUS MIGRAINOSUS: ICD-10-CM

## 2023-10-17 DIAGNOSIS — O99.353 DISEASES OF THE NERVOUS SYSTEM COMPLICATING PREGNANCY, THIRD TRIMESTER: ICD-10-CM

## 2023-10-17 DIAGNOSIS — O99.343 OTHER MENTAL DISORDERS COMPLICATING PREGNANCY, THIRD TRIMESTER: ICD-10-CM

## 2023-10-17 DIAGNOSIS — Z3A.34 34 WEEKS GESTATION OF PREGNANCY: ICD-10-CM

## 2023-10-17 DIAGNOSIS — F41.9 ANXIETY DISORDER, UNSPECIFIED: ICD-10-CM

## 2023-10-17 DIAGNOSIS — O09.523 SUPERVISION OF ELDERLY MULTIGRAVIDA, THIRD TRIMESTER: ICD-10-CM

## 2023-10-17 DIAGNOSIS — O41.03X0 OLIGOHYDRAMNIOS, THIRD TRIMESTER, NOT APPLICABLE OR UNSPECIFIED: ICD-10-CM

## 2023-10-18 ENCOUNTER — ASOB RESULT (OUTPATIENT)
Age: 38
End: 2023-10-18

## 2023-10-18 ENCOUNTER — APPOINTMENT (OUTPATIENT)
Dept: ANTEPARTUM | Facility: CLINIC | Age: 38
End: 2023-10-18
Payer: COMMERCIAL

## 2023-10-18 PROCEDURE — 76819 FETAL BIOPHYS PROFIL W/O NST: CPT

## 2023-10-20 ENCOUNTER — APPOINTMENT (OUTPATIENT)
Dept: ANTEPARTUM | Facility: CLINIC | Age: 38
End: 2023-10-20
Payer: COMMERCIAL

## 2023-10-20 ENCOUNTER — INPATIENT (INPATIENT)
Facility: HOSPITAL | Age: 38
LOS: 3 days | Discharge: HOME | DRG: 832 | End: 2023-10-24
Attending: OBSTETRICS & GYNECOLOGY | Admitting: OBSTETRICS & GYNECOLOGY
Payer: COMMERCIAL

## 2023-10-20 ENCOUNTER — ASOB RESULT (OUTPATIENT)
Age: 38
End: 2023-10-20

## 2023-10-20 VITALS — DIASTOLIC BLOOD PRESSURE: 82 MMHG | HEART RATE: 116 BPM | SYSTOLIC BLOOD PRESSURE: 135 MMHG

## 2023-10-20 DIAGNOSIS — Z90.49 ACQUIRED ABSENCE OF OTHER SPECIFIED PARTS OF DIGESTIVE TRACT: Chronic | ICD-10-CM

## 2023-10-20 DIAGNOSIS — Z98.890 OTHER SPECIFIED POSTPROCEDURAL STATES: Chronic | ICD-10-CM

## 2023-10-20 DIAGNOSIS — Z34.80 ENCOUNTER FOR SUPERVISION OF OTHER NORMAL PREGNANCY, UNSPECIFIED TRIMESTER: ICD-10-CM

## 2023-10-20 DIAGNOSIS — O41.03X0 OLIGOHYDRAMNIOS, THIRD TRIMESTER, NOT APPLICABLE OR UNSPECIFIED: ICD-10-CM

## 2023-10-20 DIAGNOSIS — O26.899 OTHER SPECIFIED PREGNANCY RELATED CONDITIONS, UNSPECIFIED TRIMESTER: ICD-10-CM

## 2023-10-20 LAB
BASOPHILS # BLD AUTO: 0.03 K/UL — SIGNIFICANT CHANGE UP (ref 0–0.2)
BASOPHILS # BLD AUTO: 0.03 K/UL — SIGNIFICANT CHANGE UP (ref 0–0.2)
BASOPHILS NFR BLD AUTO: 0.4 % — SIGNIFICANT CHANGE UP (ref 0–2)
BASOPHILS NFR BLD AUTO: 0.4 % — SIGNIFICANT CHANGE UP (ref 0–2)
BLD GP AB SCN SERPL QL: NEGATIVE — SIGNIFICANT CHANGE UP
BLD GP AB SCN SERPL QL: NEGATIVE — SIGNIFICANT CHANGE UP
EOSINOPHIL # BLD AUTO: 0.01 K/UL — SIGNIFICANT CHANGE UP (ref 0–0.5)
EOSINOPHIL # BLD AUTO: 0.01 K/UL — SIGNIFICANT CHANGE UP (ref 0–0.5)
EOSINOPHIL NFR BLD AUTO: 0.1 % — SIGNIFICANT CHANGE UP (ref 0–6)
EOSINOPHIL NFR BLD AUTO: 0.1 % — SIGNIFICANT CHANGE UP (ref 0–6)
HCT VFR BLD CALC: 33.1 % — LOW (ref 34.5–45)
HCT VFR BLD CALC: 33.1 % — LOW (ref 34.5–45)
HGB BLD-MCNC: 11.9 G/DL — SIGNIFICANT CHANGE UP (ref 11.5–15.5)
HGB BLD-MCNC: 11.9 G/DL — SIGNIFICANT CHANGE UP (ref 11.5–15.5)
IMM GRANULOCYTES NFR BLD AUTO: 0.9 % — SIGNIFICANT CHANGE UP (ref 0–0.9)
IMM GRANULOCYTES NFR BLD AUTO: 0.9 % — SIGNIFICANT CHANGE UP (ref 0–0.9)
LYMPHOCYTES # BLD AUTO: 1.78 K/UL — SIGNIFICANT CHANGE UP (ref 1–3.3)
LYMPHOCYTES # BLD AUTO: 1.78 K/UL — SIGNIFICANT CHANGE UP (ref 1–3.3)
LYMPHOCYTES # BLD AUTO: 21.9 % — SIGNIFICANT CHANGE UP (ref 13–44)
LYMPHOCYTES # BLD AUTO: 21.9 % — SIGNIFICANT CHANGE UP (ref 13–44)
MCHC RBC-ENTMCNC: 31.4 PG — SIGNIFICANT CHANGE UP (ref 27–34)
MCHC RBC-ENTMCNC: 31.4 PG — SIGNIFICANT CHANGE UP (ref 27–34)
MCHC RBC-ENTMCNC: 36 GM/DL — SIGNIFICANT CHANGE UP (ref 32–36)
MCHC RBC-ENTMCNC: 36 GM/DL — SIGNIFICANT CHANGE UP (ref 32–36)
MCV RBC AUTO: 87.3 FL — SIGNIFICANT CHANGE UP (ref 80–100)
MCV RBC AUTO: 87.3 FL — SIGNIFICANT CHANGE UP (ref 80–100)
MONOCYTES # BLD AUTO: 0.41 K/UL — SIGNIFICANT CHANGE UP (ref 0–0.9)
MONOCYTES # BLD AUTO: 0.41 K/UL — SIGNIFICANT CHANGE UP (ref 0–0.9)
MONOCYTES NFR BLD AUTO: 5 % — SIGNIFICANT CHANGE UP (ref 2–14)
MONOCYTES NFR BLD AUTO: 5 % — SIGNIFICANT CHANGE UP (ref 2–14)
NEUTROPHILS # BLD AUTO: 5.84 K/UL — SIGNIFICANT CHANGE UP (ref 1.8–7.4)
NEUTROPHILS # BLD AUTO: 5.84 K/UL — SIGNIFICANT CHANGE UP (ref 1.8–7.4)
NEUTROPHILS NFR BLD AUTO: 71.7 % — SIGNIFICANT CHANGE UP (ref 43–77)
NEUTROPHILS NFR BLD AUTO: 71.7 % — SIGNIFICANT CHANGE UP (ref 43–77)
NRBC # BLD: 0 /100 WBCS — SIGNIFICANT CHANGE UP (ref 0–0)
NRBC # BLD: 0 /100 WBCS — SIGNIFICANT CHANGE UP (ref 0–0)
PLATELET # BLD AUTO: 301 K/UL — SIGNIFICANT CHANGE UP (ref 150–400)
PLATELET # BLD AUTO: 301 K/UL — SIGNIFICANT CHANGE UP (ref 150–400)
RBC # BLD: 3.79 M/UL — LOW (ref 3.8–5.2)
RBC # BLD: 3.79 M/UL — LOW (ref 3.8–5.2)
RBC # FLD: 12.5 % — SIGNIFICANT CHANGE UP (ref 10.3–14.5)
RBC # FLD: 12.5 % — SIGNIFICANT CHANGE UP (ref 10.3–14.5)
RH IG SCN BLD-IMP: POSITIVE — SIGNIFICANT CHANGE UP
RH IG SCN BLD-IMP: POSITIVE — SIGNIFICANT CHANGE UP
WBC # BLD: 8.14 K/UL — SIGNIFICANT CHANGE UP (ref 3.8–10.5)
WBC # BLD: 8.14 K/UL — SIGNIFICANT CHANGE UP (ref 3.8–10.5)
WBC # FLD AUTO: 8.14 K/UL — SIGNIFICANT CHANGE UP (ref 3.8–10.5)
WBC # FLD AUTO: 8.14 K/UL — SIGNIFICANT CHANGE UP (ref 3.8–10.5)

## 2023-10-20 PROCEDURE — 76819 FETAL BIOPHYS PROFIL W/O NST: CPT

## 2023-10-20 RX ORDER — ASPIRIN/CALCIUM CARB/MAGNESIUM 324 MG
81 TABLET ORAL DAILY
Refills: 0 | Status: DISCONTINUED | OUTPATIENT
Start: 2023-10-20 | End: 2023-10-24

## 2023-10-20 RX ORDER — NORTRIPTYLINE HYDROCHLORIDE 10 MG/1
20 CAPSULE ORAL AT BEDTIME
Refills: 0 | Status: DISCONTINUED | OUTPATIENT
Start: 2023-10-20 | End: 2023-10-24

## 2023-10-20 RX ADMIN — Medication 12 MILLIGRAM(S): at 17:24

## 2023-10-20 RX ADMIN — NORTRIPTYLINE HYDROCHLORIDE 20 MILLIGRAM(S): 10 CAPSULE ORAL at 22:08

## 2023-10-20 NOTE — OB PROVIDER H&P - NSHPPHYSICALEXAM_GEN_ALL_CORE
ICU Vital Signs Last 24 Hrs  T(C): 37.1 (20 Oct 2023 16:28), Max: 37.1 (20 Oct 2023 16:28)  T(F): 98.8 (20 Oct 2023 16:28), Max: 98.8 (20 Oct 2023 16:28)  HR: 107 (20 Oct 2023 17:19) (107 - 133)  BP: 135/82 (20 Oct 2023 16:28) (135/82 - 135/82)  RR: 18 (20 Oct 2023 16:28) (18 - 18)  SpO2: 97% (20 Oct 2023 17:19) (92% - 98%)    O2 Parameters below as of 20 Oct 2023 16:28  Patient On (Oxygen Delivery Method): room air    gen: NAD  cards: clear S1S2 RRR  pulm: CTA b/l  abd: soft, gravid. nontender throughout.  SSE: os visualized - closed. No pool. Neg nitrazine. Neg fern.  VE: closed/50%/-3 w/ tone     EFM: reactive nst  toco: q 3 min

## 2023-10-20 NOTE — OB PROVIDER H&P - ATTENDING COMMENTS
Pt seen and examined. Agree with PA note.  FOr admission for hydration and monitoring due to oligohydramnios  For BMZ course.  all questions answered.    ASHLEYE

## 2023-10-20 NOTE — OB RN PATIENT PROFILE - FALL HARM RISK - UNIVERSAL INTERVENTIONS
Bed in lowest position, wheels locked, appropriate side rails in place/Call bell, personal items and telephone in reach/Instruct patient to call for assistance before getting out of bed or chair/Non-slip footwear when patient is out of bed/[place] to call system/Physically safe environment - no spills, clutter or unnecessary equipment/Purposeful Proactive Rounding/Room/bathroom lighting operational, light cord in reach

## 2023-10-20 NOTE — OB PROVIDER H&P - HISTORY OF PRESENT ILLNESS
37 y/o  MATTHIEU 23 @ 35.2 wks ga presenting for admission due to oligohydramnios w/ ROBERT 2.8. Pt reports having a low normal fluid level for over a week but today officially oligo w/ placental calcifications.  EFW 5lb 8z oz last week. Otherwise, pregnancy been uncomplicated.  Denies VB, LOF, Ctx, fever. Pt was ruled out for rupture on Friday and was closed on exam.     all: Macrobid -   meds:  pnv            asa             Nortriptyline 20 mg at night                pmhx: Migraines     ob: current  gyn: none  surg: h/o cholecystectomy          h/o umbilical hernia repair   fhx: denies  soc: denies x 3. Pt is an RN at [employer].   39 y/o  MATTHIEU 23 @ 35.2 wks ga presenting for admission due to oligohydramnios w/ ROBERT 2.18. Pt reports having a low normal fluid level for over a week but today officially oligo w/ placental calcifications.  EFW 5lb 8z oz last week. Otherwise, pregnancy been uncomplicated.  Denies VB, LOF, Ctx, fever. Pt was ruled out for rupture on Friday and was closed on exam.     all: Macrobid -   meds:  pnv            asa             Nortriptyline 20 mg at night                pmhx: Migraines     ob: current  gyn: none  surg: h/o cholecystectomy          h/o umbilical hernia repair   fhx: denies  soc: denies x 3. Pt is an RN at [employer].   37 y/o  MATTHIEU 23 @ 35.2 wks ga presenting for admission due to oligohydramnios w/ ROBERT 2.18. Pt reports having a low normal fluid level for over a week but today officially oligo w/ placental calcifications.  EFW 5lb 8z oz last week. Otherwise, pregnancy been uncomplicated.  Denies VB, LOF, Ctx, fever. Pt was ruled out for rupture on Friday and was closed on exam.     all: Macrobid - rash  meds:  pnv            asa             Nortriptyline 20 mg at night                pmhx: Migraines     ob: current  gyn: none  surg: h/o cholecystectomy          h/o umbilical hernia repair   fhx: denies  soc: denies x 3. Pt is an RN at [employer].

## 2023-10-20 NOTE — OB PROVIDER H&P - NSLOWPPHRISK_OBGYN_A_OB
No previous uterine incision/[place] Pregnancy/Less than or equal to 4 previous vaginal births/No known bleeding disorder/No history of postpartum hemorrhage

## 2023-10-20 NOTE — OB PROVIDER H&P - PROBLEM SELECTOR PLAN 1
Admit  Routine labs  Iv access and IV fluids  Prental vitamin  BMZ for fetal lung maturity.   NST reactive >1 hour. Transfer to floor.  GBS culture performed in office. f/u result.    2. Fetal well being  -daily NST  -BMZ    3. Maternal well being/headaches  -cont nortriptyline nightly  -cont baby asa  -cont prenatal vitamin     d/w dr. Zenon Barba

## 2023-10-21 ENCOUNTER — APPOINTMENT (OUTPATIENT)
Dept: ANTEPARTUM | Facility: CLINIC | Age: 38
End: 2023-10-21
Payer: COMMERCIAL

## 2023-10-21 ENCOUNTER — ASOB RESULT (OUTPATIENT)
Age: 38
End: 2023-10-21

## 2023-10-21 PROBLEM — G43.909 MIGRAINE, UNSPECIFIED, NOT INTRACTABLE, WITHOUT STATUS MIGRAINOSUS: Chronic | Status: ACTIVE | Noted: 2023-10-13

## 2023-10-21 PROCEDURE — 76818 FETAL BIOPHYS PROFILE W/NST: CPT | Mod: 26

## 2023-10-21 PROCEDURE — 76820 UMBILICAL ARTERY ECHO: CPT | Mod: 26

## 2023-10-21 RX ORDER — LANOLIN ALCOHOL/MO/W.PET/CERES
3 CREAM (GRAM) TOPICAL AT BEDTIME
Refills: 0 | Status: DISCONTINUED | OUTPATIENT
Start: 2023-10-21 | End: 2023-10-24

## 2023-10-21 RX ORDER — VALACYCLOVIR 500 MG/1
500 TABLET, FILM COATED ORAL
Refills: 0 | Status: DISCONTINUED | OUTPATIENT
Start: 2023-10-21 | End: 2023-10-24

## 2023-10-21 RX ORDER — DIPHENHYDRAMINE HCL 50 MG
25 CAPSULE ORAL EVERY 6 HOURS
Refills: 0 | Status: DISCONTINUED | OUTPATIENT
Start: 2023-10-21 | End: 2023-10-24

## 2023-10-21 RX ORDER — DIPHENHYDRAMINE HCL 50 MG
25 CAPSULE ORAL ONCE
Refills: 0 | Status: COMPLETED | OUTPATIENT
Start: 2023-10-21 | End: 2023-10-21

## 2023-10-21 RX ORDER — DIPHENHYDRAMINE HCL 50 MG
50 CAPSULE ORAL ONCE
Refills: 0 | Status: COMPLETED | OUTPATIENT
Start: 2023-10-21 | End: 2023-10-21

## 2023-10-21 RX ADMIN — Medication 81 MILLIGRAM(S): at 11:21

## 2023-10-21 RX ADMIN — Medication 50 MILLIGRAM(S): at 04:42

## 2023-10-21 RX ADMIN — VALACYCLOVIR 500 MILLIGRAM(S): 500 TABLET, FILM COATED ORAL at 17:44

## 2023-10-21 RX ADMIN — Medication 12 MILLIGRAM(S): at 17:00

## 2023-10-21 RX ADMIN — Medication 3 MILLIGRAM(S): at 22:53

## 2023-10-21 RX ADMIN — Medication 25 MILLIGRAM(S): at 23:07

## 2023-10-21 RX ADMIN — NORTRIPTYLINE HYDROCHLORIDE 20 MILLIGRAM(S): 10 CAPSULE ORAL at 21:38

## 2023-10-22 ENCOUNTER — APPOINTMENT (OUTPATIENT)
Dept: ANTEPARTUM | Facility: CLINIC | Age: 38
End: 2023-10-22
Payer: COMMERCIAL

## 2023-10-22 ENCOUNTER — ASOB RESULT (OUTPATIENT)
Age: 38
End: 2023-10-22

## 2023-10-22 PROCEDURE — 76818 FETAL BIOPHYS PROFILE W/NST: CPT | Mod: 26

## 2023-10-22 RX ADMIN — NORTRIPTYLINE HYDROCHLORIDE 20 MILLIGRAM(S): 10 CAPSULE ORAL at 21:29

## 2023-10-22 RX ADMIN — VALACYCLOVIR 500 MILLIGRAM(S): 500 TABLET, FILM COATED ORAL at 18:20

## 2023-10-22 RX ADMIN — Medication 25 MILLIGRAM(S): at 22:23

## 2023-10-22 RX ADMIN — VALACYCLOVIR 500 MILLIGRAM(S): 500 TABLET, FILM COATED ORAL at 06:06

## 2023-10-22 RX ADMIN — Medication 81 MILLIGRAM(S): at 13:35

## 2023-10-23 ENCOUNTER — ASOB RESULT (OUTPATIENT)
Age: 38
End: 2023-10-23

## 2023-10-23 ENCOUNTER — APPOINTMENT (OUTPATIENT)
Dept: ANTEPARTUM | Facility: CLINIC | Age: 38
End: 2023-10-23
Payer: COMMERCIAL

## 2023-10-23 LAB
BLD GP AB SCN SERPL QL: NEGATIVE — SIGNIFICANT CHANGE UP
BLD GP AB SCN SERPL QL: NEGATIVE — SIGNIFICANT CHANGE UP
RH IG SCN BLD-IMP: POSITIVE — SIGNIFICANT CHANGE UP
RH IG SCN BLD-IMP: POSITIVE — SIGNIFICANT CHANGE UP

## 2023-10-23 PROCEDURE — 76818 FETAL BIOPHYS PROFILE W/NST: CPT | Mod: 26

## 2023-10-23 RX ORDER — SODIUM CHLORIDE 9 MG/ML
1000 INJECTION, SOLUTION INTRAVENOUS
Refills: 0 | Status: DISCONTINUED | OUTPATIENT
Start: 2023-10-23 | End: 2023-10-24

## 2023-10-23 RX ADMIN — Medication 25 MILLIGRAM(S): at 22:40

## 2023-10-23 RX ADMIN — VALACYCLOVIR 500 MILLIGRAM(S): 500 TABLET, FILM COATED ORAL at 05:42

## 2023-10-23 RX ADMIN — NORTRIPTYLINE HYDROCHLORIDE 20 MILLIGRAM(S): 10 CAPSULE ORAL at 21:52

## 2023-10-23 RX ADMIN — VALACYCLOVIR 500 MILLIGRAM(S): 500 TABLET, FILM COATED ORAL at 17:30

## 2023-10-23 RX ADMIN — Medication 81 MILLIGRAM(S): at 12:44

## 2023-10-23 NOTE — CHART NOTE - NSCHARTNOTEFT_GEN_A_CORE
BPP Note    On rounds patient noting decreased fetal movement overnight after sleeping, but with noted movement during NST. Overall states that this has been consistent with the past few days since she has been in the hospital.    BPP 6/8 w/ MVP 1.6  AM NST reactive and reassuring    to be d/w attending Dr Zenon Mccloud  PGY3

## 2023-10-24 ENCOUNTER — TRANSCRIPTION ENCOUNTER (OUTPATIENT)
Age: 38
End: 2023-10-24

## 2023-10-24 ENCOUNTER — APPOINTMENT (OUTPATIENT)
Dept: ANTEPARTUM | Facility: CLINIC | Age: 38
End: 2023-10-24
Payer: COMMERCIAL

## 2023-10-24 ENCOUNTER — ASOB RESULT (OUTPATIENT)
Age: 38
End: 2023-10-24

## 2023-10-24 VITALS
RESPIRATION RATE: 20 BRPM | OXYGEN SATURATION: 99 % | SYSTOLIC BLOOD PRESSURE: 108 MMHG | DIASTOLIC BLOOD PRESSURE: 71 MMHG | HEART RATE: 62 BPM | TEMPERATURE: 98 F

## 2023-10-24 PROCEDURE — 76818 FETAL BIOPHYS PROFILE W/NST: CPT | Mod: 26

## 2023-10-24 RX ADMIN — VALACYCLOVIR 500 MILLIGRAM(S): 500 TABLET, FILM COATED ORAL at 05:39

## 2023-10-24 NOTE — DISCHARGE NOTE ANTEPARTUM - INSTRUCTIONS
Keep your follow up appointment with doctor as instructed and call doctor if you notice decreased fetal movement , experiencing cramping, vaginal bleeding, leakage of fluids, and with any questions or concerns.

## 2023-10-24 NOTE — PROGRESS NOTE ADULT - ASSESSMENT
39 yo  at 35+3 wks admitted for monitoring of oligohydramnios (ROBERT 2.8 on 10/20). Maternal and fetal status reassuring overnight.     #Oligohydramnios  - Repeat BPP in AM  - TOD 36+0 - 37+6    #Fetal well being  - daily NST  - BMZ for fetal lung maturity (10/20-)   - GBS culture performed in office, f/u result    #Maternal well being  - c/w home nortriptyline nightly  - c/w ASA, PNV  - Regular diet  - DVT ppx, ambulation    MARC Reyes, PGY3   MARC Reyes, PGY3
MFM CONSULT FOLLOW UP NOTE    S: Patient feeling well this AM, ambulating, tolerating PO with mild heartburn, no other complaints.  Good fetal movement.  No contractions, leakage of fluid, vaginal bleeding.     Vital Signs Last 24 Hours  T(C): 36.8 (10-24-23 @ 09:05), Max: 36.9 (10-23-23 @ 17:03)  HR: 88 (10-24-23 @ 09:05) (69 - 97)  BP: 127/77 (10-24-23 @ 09:05) (108/73 - 135/87)  RR: 18 (10-24-23 @ 09:05) (18 - 18)  SpO2: 97% (10-24-23 @ 09:05) (97% - 99%)      Physical Exam:  General: NAD, sitting up in bed  Abdomen: Soft, non-tender, gravid, no RUQ tenderness  Ext: No pain or swelling bilaterally     NST Baseline 140, moderate variability, accelerations present, no decelerations  TOCO: infrequent contractions     Ultrasound: Cephalic, DVP 3, ROBERT 8, BPP 8/8 (see AS for full report)      MEDICATIONS  (STANDING):  aspirin  chewable 81 milliGRAM(s) Oral daily  lactated ringers. 1000 milliLiter(s) (100 mL/Hr) IV Continuous <Continuous>  melatonin 3 milliGRAM(s) Oral at bedtime  nortriptyline 20 milliGRAM(s) Oral at bedtime  valACYclovir 500 milliGRAM(s) Oral two times a day    MEDICATIONS  (PRN):  diphenhydrAMINE 25 milliGRAM(s) Oral every 6 hours PRN Rash and/or Itching
37 yo  at 35w4d admitted for monitoring of oligohydramnios (ROBERT 2.8 on 10/20), found to be anhydramnios yesterday. Maternal and fetal status reassuring overnight.     #Oligohydramnios  - Anhydramnios on scan 10/21  - TOD 36+0 - 37+6    #Fetal well being  - daily NST TID   - BMZ for fetal lung maturity (10/20-)   - GBS culture performed in office, f/u result    #Maternal well being  - c/w home nortriptyline nightly  - c/w ASA, PNV  - Regular diet  - DVT ppx, ambulation    NEVA Nam PGY3
39 yo  at 35w6d admitted for monitoring of oligohydramnios (ROBERT 2.8 on 10/20), found to be with anhydramnios, then subsequently with normal ROBERT 10/23. Maternal and fetal status reassuring overnight.     #Oligohydramnios  - Anhydramnios on scan 10/21  - ROBERT 6.9 on scan 10/23  - TOD: 36w0d to 37w0d if repeated oligohydramnios; TBD if ROBERT remains wnl    #Fetal well being  - daily NST TID   - BMZ for fetal lung maturity (10/20-10/21)  - GBS culture performed in office, f/u result  - ATU (10/23): vtx, ROBERT 6.9, BPP 8/8  - ATU (10/21): vtx, ant, anhydramnios, BPP 8/10    #Maternal well being  - c/w home nortriptyline nightly  - c/w ASA, PNV  - Regular diet  - DVT ppx, ambulation    Gabriela Sheu  PGY3
39 yo  at 35w5d admitted for monitoring of oligohydramnios (ROBERT 2.8 on 10/20), found to be anhydramnios yesterday. Maternal and fetal status reassuring overnight.     #Oligohydramnios  - Anhydramnios on scan 10/21  - TOD: 36w0d to 37w0d    #Fetal well being  - daily NST TID   - BMZ for fetal lung maturity (10/20-10/21)  - GBS culture performed in office, f/u result    #Maternal well being  - c/w home nortriptyline nightly  - c/w ASA, PNV  - Regular diet  - DVT ppx, ambulation    Gabriela Sheu  PGY3
A/P: 38 year old P0 at 35w4d admitted in the setting of anhydramnios and decreased fetal movement otherwise VSS. Asxs. Fetal testing has remained reassuring with 8/10 bpp -2 for fluid. Agree with continued admission and planned delivery at 36 weeks for anhydramnios, earlier if testing becomes non reassuring. Will continue to follow    Patient seen w/ Dr. Nicholas Elizondo (M attending)    Tyler Montenegro M.D. FACOG PGY-7  Maternal Fetal Medicine Fellow  Cell: [phone] if after 5pm or weekend ask labor and delivery for on call fellow

## 2023-10-24 NOTE — DISCHARGE NOTE ANTEPARTUM - CARE PROVIDER_API CALL
Juaquin Estrada  Obstetrics and Gynecology  [address] [facility], [place] [address]  Phone: [phone]  Fax: [phone]  Follow Up Time:

## 2023-10-24 NOTE — DISCHARGE NOTE ANTEPARTUM - NS MD DC FALL RISK RISK
For information on Fall & Injury Prevention, visit: [email or web address] OR  [email or web address] OR  https://www.cdc.gov/steadi/patient.html

## 2023-10-24 NOTE — PROGRESS NOTE ADULT - TIME BILLING
Thank you for requesting a MFM consultation on this patient for the entities listed above. The total time spent in preparation for this visit, medical history taking, orders, review of records, counseling the patient and the family member and writing the note was 25 minutes.

## 2023-10-24 NOTE — PROGRESS NOTE ADULT - ATTENDING COMMENTS
I have personally seen, examined, and participated in the care of this patient. I have reviewed all pertinent clinical information, including history, physical exam, plan, and the Resident 's note and agree except as noted.        ass/plan- oligohydramnios improving.              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                Eval by mfm and sono today. further management pending testing.
MFM attendin y/o  @ 35 4/7 weeks with low fluid since 31 weeks of unknown etiology. Rupture workup has been negative and patient denies any LOF. Patient has been feeling less movement since ~ 3 days ago.  Bedside sonogram performed again today with anhydramnios noted, BPP 8/10 (-2 fluid). Cephalic.  NST was reactive.   This is likely due to uteroplacental insufficiency although fetal growth was normal on 10/13.   Would recommend inpatient observation with NST 3x day, monitoring of fetal movement and BPP tomorrow.   Delivery would be recommended at 36 weeks or earlier for any signs of fetal distress.   S/p steroids. NICU consult per pt request.     I was present and participated in all key portions of assessment and plan. Agree with resident's note.     Dr Nicholas Elizondo
Pt states that has had decreased fetal move since admission.  NST has been reactive  ROBERT this am was 1  Repeat BPP at bedside with sonographer at 10 AM, confirms ROBERT of 7.  otherwise 8/8 with dopplers to be done (pending).  NO other labor sxs  abd soft and nontender, fundus nontender and fetal movt palpated by me but not perceived by the patient.   A/P:  35+ weeks adm for fluctuating robert, mostly in oligohydramnios range.  Will continue to monitor in house.  cont to IVF.  Delivery plans dependent on ROBERT.  PLEE
ob attg  pt is comfortable and well.  has no c/o.  denies leakage of fluid or abnl discharge per vagina.  no vag bleeding.  baby is active.  exam: abd is soft, NT, ND.  uterus is soft, non tender and appropriate for this gestational age.  ext: trace edema b/l  a/p: primagravida, IUP at 35w3d.  oligohydramnios of unknown etiology.    hospital observation  bedrest  IV hydration: 125 cc/hr  steroids for FLM: 2nd dose to be given today  NST twice a day  ob sono / BPP / ROBERT daily  long term plan and delivery plan to be determined pending test results and pt's clinical course  MFM inupt appreciated.
ob attg note  agree w/ above resident note  pt is well, and does not offer any c/o  sono yesterday: anhydramnios.  discussed w/ MFM  plan for delivery 36 to 37 wks.  continue close f/u and frequent testing (NST tid, ob sono/BPP daily)  fetal kick counting discussed w/ pt and she was instructed to do it.  if any concern re dec or absent FM's, pt will notify her RN asap.
[facility] attending addendum  Pt seen, scanned, and counseled with r3 Dr. Mccloud and [facility] fellow Dr. Adams     Reassured Ms. Santiago that the amniotic fluid volume is normal again today and reviewed the physiology behind normal fluctuations in amniotic fluid volume. Stable for discharge to home with outpatient monitoring - next for BPP on Thursday 10/26/23 at [facility] office.    Discussed with referring OB Dr. Gabriel Estrada who is in agreement.  Darby Florence

## 2023-10-24 NOTE — DISCHARGE NOTE ANTEPARTUM - HOSPITAL COURSE
Patient was admitted for inpatient monitoring due to oligohydramnios. Over hospital course was initially found to be with anhydramnios however afterwards had normal ROBERT on repeat scans greater than 5cm. Patient was discharged in stable condition with plan for close outpatient follow-up and scan.

## 2023-10-24 NOTE — DISCHARGE NOTE ANTEPARTUM - CARE PLAN
Principal Discharge DX:	Oligohydramnios in third trimester  Assessment and plan of treatment:	Please follow up at [address] for a repeat ultrasound on 10/26.   1

## 2023-10-24 NOTE — DISCHARGE NOTE ANTEPARTUM - PATIENT PORTAL LINK FT
You can access the FollowMyHealth Patient Portal offered by [facility] by registering at the following website: [email or web address]. By joining [employer]’s FollowMyHealth portal, you will also be able to view your health information using other applications (apps) compatible with our system.

## 2023-10-24 NOTE — PROGRESS NOTE ADULT - SUBJECTIVE AND OBJECTIVE BOX
R3 Antepartum Note    Patient seen and examined at bedside, no acute overnight events, no new changes. No acute complaints. Pt reports +FM, denies LOF, VB, ctx, HA, epigastric pain, blurred vision, CP, SOB, N/V, fevers, and chills. Notes a small amount of heartburn, declines medication at this time.    Vital Signs Last 24 Hours  T(C): 36.8 (10-24-23 @ 05:46), Max: 36.9 (10-23-23 @ 17:03)  HR: 90 (10-24-23 @ 05:46) (69 - 97)  BP: 130/76 (10-24-23 @ 05:46) (108/73 - 135/87)  RR: 18 (10-24-23 @ 05:46) (18 - 18)  SpO2: 97% (10-24-23 @ 05:46) (97% - 99%)    CAPILLARY BLOOD GLUCOSE    Physical Exam:  General: NAD  Abdomen: Soft, non-tender, gravid  Ext: No pain or swelling    NST reactive overnight    MEDICATIONS  (STANDING):  aspirin  chewable 81 milliGRAM(s) Oral daily  lactated ringers. 1000 milliLiter(s) (100 mL/Hr) IV Continuous <Continuous>  melatonin 3 milliGRAM(s) Oral at bedtime  nortriptyline 20 milliGRAM(s) Oral at bedtime  valACYclovir 500 milliGRAM(s) Oral two times a day    MEDICATIONS  (PRN):  diphenhydrAMINE 25 milliGRAM(s) Oral every 6 hours PRN Rash and/or Itching
R3 Antepartum Note    Patient seen and examined at bedside, no acute overnight events. No acute complaints. Pt reports decreased fetal movement after going to sleep last night, but notes that she felt movement during the NST around 530am. Denies LOF, VB, ctx, HA, epigastric pain, blurred vision, CP, SOB, N/V, fevers, and chills.    Vital Signs Last 24 Hours  T(C): 36.7 (10-23-23 @ 05:43), Max: 36.8 (10-22-23 @ 16:50)  HR: 87 (10-23-23 @ 05:43) (87 - 103)  BP: 124/76 (10-23-23 @ 05:43) (121/73 - 132/80)  RR: 18 (10-23-23 @ 05:43) (18 - 18)  SpO2: 98% (10-23-23 @ 05:43) (95% - 100%)    CAPILLARY BLOOD GLUCOSE    Physical Exam:  General: NAD  Abdomen: Soft, non-tender, gravid  Ext: No pain or swelling    NST reactive overnight    MEDICATIONS  (STANDING):  aspirin  chewable 81 milliGRAM(s) Oral daily  melatonin 3 milliGRAM(s) Oral at bedtime  nortriptyline 20 milliGRAM(s) Oral at bedtime  valACYclovir 500 milliGRAM(s) Oral two times a day    MEDICATIONS  (PRN):  diphenhydrAMINE 25 milliGRAM(s) Oral every 6 hours PRN Rash and/or Itching
R3 Antepartum Note, HD#3    Patient seen and examined at bedside, no acute overnight events. No acute complaints. Pt reports +FM, denies LOF, VB, ctx, HA, epigastric pain, blurred vision, CP, SOB, N/V, fevers, and chills.    Vital Signs Last 24 Hours  T(C): 36.6 (10-21-23 @ 21:39), Max: 36.8 (10-21-23 @ 13:02)  HR: 88 (10-21-23 @ 21:39) (88 - 109)  BP: 137/85 (10-21-23 @ 21:39) (124/70 - 137/85)  RR: 18 (10-21-23 @ 21:39) (18 - 18)  SpO2: 96% (10-21-23 @ 21:39) (94% - 97%)    CAPILLARY BLOOD GLUCOSE          Physical Exam:  General: NAD  Abdomen: Soft, non-tender, gravid  Ext: No pain or swelling    Labs:             11.9   8.14  )-----------( 301      ( 10-20 @ 17:24 )             33.1                   MEDICATIONS  (STANDING):  aspirin  chewable 81 milliGRAM(s) Oral daily  betamethasone Injectable 12 milliGRAM(s) IntraMuscular every 24 hours  melatonin 3 milliGRAM(s) Oral at bedtime  nortriptyline 20 milliGRAM(s) Oral at bedtime  valACYclovir 500 milliGRAM(s) Oral two times a day    MEDICATIONS  (PRN):  diphenhydrAMINE 25 milliGRAM(s) Oral every 6 hours PRN Rash and/or Itching  
Antepartum Note, HD#2    Interval events: n/a    Patient seen and examined at bedside, no acute overnight events. No acute complaints. Pt reports +FM, denies LOF, VB, ctx, HA, epigastric pain, blurred vision, CP, SOB, N/V, fevers, and chills.    Vital Signs Last 24 Hours  T(C): 36.8 (10-20-23 @ 21:14), Max: 37.1 (10-20-23 @ 16:28)  HR: 98 (10-20-23 @ 21:14) (98 - 133)  BP: 132/88 (10-20-23 @ 21:14) (132/88 - 135/82)  RR: 18 (10-20-23 @ 21:14) (16 - 18)  SpO2: 95% (10-20-23 @ 21:14) (92% - 98%)    CAPILLARY BLOOD GLUCOSE          Physical Exam:  General: NAD  Abdomen: Soft, non-tender, gravid  Ext: No pain or swelling    NST reactive overnight    Labs:             11.9   8.14  )-----------( 301      ( 10-20 @ 17:24 )             33.1                   MEDICATIONS  (STANDING):  aspirin  chewable 81 milliGRAM(s) Oral daily  betamethasone Injectable 12 milliGRAM(s) IntraMuscular every 24 hours  nortriptyline 20 milliGRAM(s) Oral at bedtime    MEDICATIONS  (PRN):  
MFM Fellow Progress Note      S: Pt feeling comfortable. Complaining of decreased fetal movements. Denies ctx/lof/vb.      O:  ICU Vital Signs Last 24 Hrs  T(C): 36.6 (22 Oct 2023 09:20), Max: 36.8 (21 Oct 2023 13:02)  T(F): 97.9 (22 Oct 2023 09:20), Max: 98.2 (21 Oct 2023 13:02)  HR: 94 (22 Oct 2023 09:20) (72 - 109)  BP: 124/81 (22 Oct 2023 09:20) (124/81 - 137/85)  BP(mean): --  ABP: --  ABP(mean): --  RR: 18 (22 Oct 2023 09:20) (18 - 18)  SpO2: 95% (22 Oct 2023 09:20) (95% - 99%)  gen: well appearing      Sonogram Bedside  Anhydramnios, BPP 6/8 -2 for fluid  NST today reactive

## 2023-10-24 NOTE — PROGRESS NOTE ADULT - NUTRITIONAL ASSESSMENT
39yo  at 35w6d, admitted for oligohydramnios, now with confirmed normal fluid x2 days.  Asymptomatic, VS wnl (some BPs 130s/80s but no mild range), exam benign.  NST reactive, BPP 8/8, and fluid normal today and stable from yesterday - fetal status is reassuring at this time. S/p BMTZ for fetal lung maturity.    - We discussed options at this time with recommendation for outpatient monitoring given normal fluid and reassuring fetal status  - Pt to follow up Thursday (10/26) for BPP  - Outpatient blood pressure monitoring at visits   - Precautions reviewed for labor/ROM/VB, decreased fetal movement    Seen with and counseled with AMINATA Padgett attending  Kary Adams MD PGY-5, MFM fellow 39yo  at 35w6d, admitted for oligohydramnios, now with confirmed normal fluid x2 days.  Asymptomatic, VS wnl (some BPs 130s/80s but no mild range), exam benign.  NST reactive, BPP 8/8, and fluid normal today and stable from yesterday - fetal status is reassuring at this time. Ruled out for ROM on admission, no other concerning findings leading to earlier oligo diagnosis.  S/p BMTZ for fetal lung maturity.    - We discussed options at this time with recommendation for outpatient monitoring given normal fluid and reassuring fetal status  - Pt to follow up Thursday (10/26) for BPP  - Outpatient blood pressure monitoring at visits   - Precautions reviewed for labor/ROM/VB, decreased fetal movement    Seen with and counseled with CARINA PadgettM attending  Kary Adams MD PGY-5, MFM fellow

## 2023-10-25 ENCOUNTER — APPOINTMENT (OUTPATIENT)
Dept: PAIN MANAGEMENT | Facility: CLINIC | Age: 38
End: 2023-10-25
Payer: COMMERCIAL

## 2023-10-25 VITALS
HEIGHT: 62 IN | WEIGHT: 192 LBS | SYSTOLIC BLOOD PRESSURE: 135 MMHG | DIASTOLIC BLOOD PRESSURE: 85 MMHG | BODY MASS INDEX: 35.33 KG/M2 | HEART RATE: 81 BPM

## 2023-10-25 PROCEDURE — 99214 OFFICE O/P EST MOD 30 MIN: CPT | Mod: 25

## 2023-10-25 PROCEDURE — 64615 CHEMODENERV MUSC MIGRAINE: CPT

## 2023-10-26 ENCOUNTER — APPOINTMENT (OUTPATIENT)
Dept: ANTEPARTUM | Facility: CLINIC | Age: 38
End: 2023-10-26
Payer: COMMERCIAL

## 2023-10-26 ENCOUNTER — ASOB RESULT (OUTPATIENT)
Age: 38
End: 2023-10-26

## 2023-10-26 PROCEDURE — 76819 FETAL BIOPHYS PROFIL W/O NST: CPT

## 2023-10-30 ENCOUNTER — ASOB RESULT (OUTPATIENT)
Age: 38
End: 2023-10-30

## 2023-10-30 ENCOUNTER — APPOINTMENT (OUTPATIENT)
Dept: ANTEPARTUM | Facility: CLINIC | Age: 38
End: 2023-10-30
Payer: COMMERCIAL

## 2023-10-30 PROCEDURE — 76819 FETAL BIOPHYS PROFIL W/O NST: CPT

## 2023-11-02 ENCOUNTER — APPOINTMENT (OUTPATIENT)
Dept: ANTEPARTUM | Facility: CLINIC | Age: 38
End: 2023-11-02
Payer: COMMERCIAL

## 2023-11-02 ENCOUNTER — ASOB RESULT (OUTPATIENT)
Age: 38
End: 2023-11-02

## 2023-11-02 PROCEDURE — 76819 FETAL BIOPHYS PROFIL W/O NST: CPT

## 2023-11-06 ENCOUNTER — ASOB RESULT (OUTPATIENT)
Age: 38
End: 2023-11-06

## 2023-11-06 ENCOUNTER — APPOINTMENT (OUTPATIENT)
Dept: ANTEPARTUM | Facility: CLINIC | Age: 38
End: 2023-11-06
Payer: COMMERCIAL

## 2023-11-06 PROCEDURE — 76819 FETAL BIOPHYS PROFIL W/O NST: CPT

## 2023-11-09 ENCOUNTER — INPATIENT (INPATIENT)
Facility: HOSPITAL | Age: 38
LOS: 5 days | Discharge: HOME HEALTH | End: 2023-11-15
Attending: OBSTETRICS & GYNECOLOGY | Admitting: OBSTETRICS & GYNECOLOGY
Payer: COMMERCIAL

## 2023-11-09 VITALS — OXYGEN SATURATION: 98 % | HEART RATE: 126 BPM

## 2023-11-09 DIAGNOSIS — Z34.80 ENCOUNTER FOR SUPERVISION OF OTHER NORMAL PREGNANCY, UNSPECIFIED TRIMESTER: ICD-10-CM

## 2023-11-09 DIAGNOSIS — Z3A.38 38 WEEKS GESTATION OF PREGNANCY: ICD-10-CM

## 2023-11-09 DIAGNOSIS — O26.899 OTHER SPECIFIED PREGNANCY RELATED CONDITIONS, UNSPECIFIED TRIMESTER: ICD-10-CM

## 2023-11-09 DIAGNOSIS — Z98.890 OTHER SPECIFIED POSTPROCEDURAL STATES: Chronic | ICD-10-CM

## 2023-11-09 DIAGNOSIS — Z90.49 ACQUIRED ABSENCE OF OTHER SPECIFIED PARTS OF DIGESTIVE TRACT: Chronic | ICD-10-CM

## 2023-11-09 PROBLEM — G47.00 INSOMNIA, UNSPECIFIED: Chronic | Status: ACTIVE | Noted: 2023-10-13

## 2023-11-09 PROBLEM — B00.9 HERPESVIRAL INFECTION, UNSPECIFIED: Chronic | Status: ACTIVE | Noted: 2023-10-20

## 2023-11-09 LAB
ALBUMIN SERPL ELPH-MCNC: 3.7 G/DL — SIGNIFICANT CHANGE UP (ref 3.3–5)
ALBUMIN SERPL ELPH-MCNC: 3.7 G/DL — SIGNIFICANT CHANGE UP (ref 3.3–5)
ALP SERPL-CCNC: 178 U/L — HIGH (ref 40–120)
ALP SERPL-CCNC: 178 U/L — HIGH (ref 40–120)
ALT FLD-CCNC: 11 U/L — SIGNIFICANT CHANGE UP (ref 10–45)
ALT FLD-CCNC: 11 U/L — SIGNIFICANT CHANGE UP (ref 10–45)
ANION GAP SERPL CALC-SCNC: 16 MMOL/L — SIGNIFICANT CHANGE UP (ref 5–17)
ANION GAP SERPL CALC-SCNC: 16 MMOL/L — SIGNIFICANT CHANGE UP (ref 5–17)
APPEARANCE UR: CLEAR — SIGNIFICANT CHANGE UP
APPEARANCE UR: CLEAR — SIGNIFICANT CHANGE UP
APTT BLD: 26.2 SEC — SIGNIFICANT CHANGE UP (ref 24.5–35.6)
APTT BLD: 26.2 SEC — SIGNIFICANT CHANGE UP (ref 24.5–35.6)
AST SERPL-CCNC: 19 U/L — SIGNIFICANT CHANGE UP (ref 10–40)
AST SERPL-CCNC: 19 U/L — SIGNIFICANT CHANGE UP (ref 10–40)
BACTERIA # UR AUTO: NEGATIVE /HPF — SIGNIFICANT CHANGE UP
BACTERIA # UR AUTO: NEGATIVE /HPF — SIGNIFICANT CHANGE UP
BASOPHILS # BLD AUTO: 0.03 K/UL — SIGNIFICANT CHANGE UP (ref 0–0.2)
BASOPHILS # BLD AUTO: 0.03 K/UL — SIGNIFICANT CHANGE UP (ref 0–0.2)
BASOPHILS NFR BLD AUTO: 0.4 % — SIGNIFICANT CHANGE UP (ref 0–2)
BASOPHILS NFR BLD AUTO: 0.4 % — SIGNIFICANT CHANGE UP (ref 0–2)
BILIRUB SERPL-MCNC: 0.2 MG/DL — SIGNIFICANT CHANGE UP (ref 0.2–1.2)
BILIRUB SERPL-MCNC: 0.2 MG/DL — SIGNIFICANT CHANGE UP (ref 0.2–1.2)
BILIRUB UR-MCNC: NEGATIVE — SIGNIFICANT CHANGE UP
BILIRUB UR-MCNC: NEGATIVE — SIGNIFICANT CHANGE UP
BLD GP AB SCN SERPL QL: NEGATIVE — SIGNIFICANT CHANGE UP
BLD GP AB SCN SERPL QL: NEGATIVE — SIGNIFICANT CHANGE UP
BUN SERPL-MCNC: 11 MG/DL — SIGNIFICANT CHANGE UP (ref 7–23)
BUN SERPL-MCNC: 11 MG/DL — SIGNIFICANT CHANGE UP (ref 7–23)
CALCIUM SERPL-MCNC: 10 MG/DL — SIGNIFICANT CHANGE UP (ref 8.4–10.5)
CALCIUM SERPL-MCNC: 10 MG/DL — SIGNIFICANT CHANGE UP (ref 8.4–10.5)
CAST: 0 /LPF — SIGNIFICANT CHANGE UP (ref 0–4)
CAST: 0 /LPF — SIGNIFICANT CHANGE UP (ref 0–4)
CHLORIDE SERPL-SCNC: 102 MMOL/L — SIGNIFICANT CHANGE UP (ref 96–108)
CHLORIDE SERPL-SCNC: 102 MMOL/L — SIGNIFICANT CHANGE UP (ref 96–108)
CO2 SERPL-SCNC: 19 MMOL/L — LOW (ref 22–31)
CO2 SERPL-SCNC: 19 MMOL/L — LOW (ref 22–31)
COLOR SPEC: YELLOW — SIGNIFICANT CHANGE UP
COLOR SPEC: YELLOW — SIGNIFICANT CHANGE UP
CREAT ?TM UR-MCNC: 38 MG/DL — SIGNIFICANT CHANGE UP
CREAT ?TM UR-MCNC: 38 MG/DL — SIGNIFICANT CHANGE UP
CREAT SERPL-MCNC: 0.64 MG/DL — SIGNIFICANT CHANGE UP (ref 0.5–1.3)
CREAT SERPL-MCNC: 0.64 MG/DL — SIGNIFICANT CHANGE UP (ref 0.5–1.3)
DIFF PNL FLD: ABNORMAL
DIFF PNL FLD: ABNORMAL
EGFR: 117 ML/MIN/1.73M2 — SIGNIFICANT CHANGE UP
EGFR: 117 ML/MIN/1.73M2 — SIGNIFICANT CHANGE UP
EOSINOPHIL # BLD AUTO: 0.05 K/UL — SIGNIFICANT CHANGE UP (ref 0–0.5)
EOSINOPHIL # BLD AUTO: 0.05 K/UL — SIGNIFICANT CHANGE UP (ref 0–0.5)
EOSINOPHIL NFR BLD AUTO: 0.6 % — SIGNIFICANT CHANGE UP (ref 0–6)
EOSINOPHIL NFR BLD AUTO: 0.6 % — SIGNIFICANT CHANGE UP (ref 0–6)
FIBRINOGEN PPP-MCNC: 600 MG/DL — HIGH (ref 200–445)
FIBRINOGEN PPP-MCNC: 600 MG/DL — HIGH (ref 200–445)
GLUCOSE SERPL-MCNC: 79 MG/DL — SIGNIFICANT CHANGE UP (ref 70–99)
GLUCOSE SERPL-MCNC: 79 MG/DL — SIGNIFICANT CHANGE UP (ref 70–99)
GLUCOSE UR QL: NEGATIVE MG/DL — SIGNIFICANT CHANGE UP
GLUCOSE UR QL: NEGATIVE MG/DL — SIGNIFICANT CHANGE UP
HCT VFR BLD CALC: 33.8 % — LOW (ref 34.5–45)
HCT VFR BLD CALC: 33.8 % — LOW (ref 34.5–45)
HGB BLD-MCNC: 11.7 G/DL — SIGNIFICANT CHANGE UP (ref 11.5–15.5)
HGB BLD-MCNC: 11.7 G/DL — SIGNIFICANT CHANGE UP (ref 11.5–15.5)
IMM GRANULOCYTES NFR BLD AUTO: 0.5 % — SIGNIFICANT CHANGE UP (ref 0–0.9)
IMM GRANULOCYTES NFR BLD AUTO: 0.5 % — SIGNIFICANT CHANGE UP (ref 0–0.9)
INR BLD: 0.94 RATIO — SIGNIFICANT CHANGE UP (ref 0.85–1.18)
INR BLD: 0.94 RATIO — SIGNIFICANT CHANGE UP (ref 0.85–1.18)
KETONES UR-MCNC: NEGATIVE MG/DL — SIGNIFICANT CHANGE UP
KETONES UR-MCNC: NEGATIVE MG/DL — SIGNIFICANT CHANGE UP
LDH SERPL L TO P-CCNC: 219 U/L — SIGNIFICANT CHANGE UP (ref 50–242)
LDH SERPL L TO P-CCNC: 219 U/L — SIGNIFICANT CHANGE UP (ref 50–242)
LEUKOCYTE ESTERASE UR-ACNC: NEGATIVE — SIGNIFICANT CHANGE UP
LEUKOCYTE ESTERASE UR-ACNC: NEGATIVE — SIGNIFICANT CHANGE UP
LYMPHOCYTES # BLD AUTO: 2.23 K/UL — SIGNIFICANT CHANGE UP (ref 1–3.3)
LYMPHOCYTES # BLD AUTO: 2.23 K/UL — SIGNIFICANT CHANGE UP (ref 1–3.3)
LYMPHOCYTES # BLD AUTO: 26.5 % — SIGNIFICANT CHANGE UP (ref 13–44)
LYMPHOCYTES # BLD AUTO: 26.5 % — SIGNIFICANT CHANGE UP (ref 13–44)
MCHC RBC-ENTMCNC: 31.3 PG — SIGNIFICANT CHANGE UP (ref 27–34)
MCHC RBC-ENTMCNC: 31.3 PG — SIGNIFICANT CHANGE UP (ref 27–34)
MCHC RBC-ENTMCNC: 34.6 GM/DL — SIGNIFICANT CHANGE UP (ref 32–36)
MCHC RBC-ENTMCNC: 34.6 GM/DL — SIGNIFICANT CHANGE UP (ref 32–36)
MCV RBC AUTO: 90.4 FL — SIGNIFICANT CHANGE UP (ref 80–100)
MCV RBC AUTO: 90.4 FL — SIGNIFICANT CHANGE UP (ref 80–100)
MONOCYTES # BLD AUTO: 0.54 K/UL — SIGNIFICANT CHANGE UP (ref 0–0.9)
MONOCYTES # BLD AUTO: 0.54 K/UL — SIGNIFICANT CHANGE UP (ref 0–0.9)
MONOCYTES NFR BLD AUTO: 6.4 % — SIGNIFICANT CHANGE UP (ref 2–14)
MONOCYTES NFR BLD AUTO: 6.4 % — SIGNIFICANT CHANGE UP (ref 2–14)
NEUTROPHILS # BLD AUTO: 5.51 K/UL — SIGNIFICANT CHANGE UP (ref 1.8–7.4)
NEUTROPHILS # BLD AUTO: 5.51 K/UL — SIGNIFICANT CHANGE UP (ref 1.8–7.4)
NEUTROPHILS NFR BLD AUTO: 65.6 % — SIGNIFICANT CHANGE UP (ref 43–77)
NEUTROPHILS NFR BLD AUTO: 65.6 % — SIGNIFICANT CHANGE UP (ref 43–77)
NITRITE UR-MCNC: NEGATIVE — SIGNIFICANT CHANGE UP
NITRITE UR-MCNC: NEGATIVE — SIGNIFICANT CHANGE UP
NRBC # BLD: 0 /100 WBCS — SIGNIFICANT CHANGE UP (ref 0–0)
NRBC # BLD: 0 /100 WBCS — SIGNIFICANT CHANGE UP (ref 0–0)
PH UR: 6 — SIGNIFICANT CHANGE UP (ref 5–8)
PH UR: 6 — SIGNIFICANT CHANGE UP (ref 5–8)
PLATELET # BLD AUTO: 265 K/UL — SIGNIFICANT CHANGE UP (ref 150–400)
PLATELET # BLD AUTO: 265 K/UL — SIGNIFICANT CHANGE UP (ref 150–400)
POTASSIUM SERPL-MCNC: 3.8 MMOL/L — SIGNIFICANT CHANGE UP (ref 3.5–5.3)
POTASSIUM SERPL-MCNC: 3.8 MMOL/L — SIGNIFICANT CHANGE UP (ref 3.5–5.3)
POTASSIUM SERPL-SCNC: 3.8 MMOL/L — SIGNIFICANT CHANGE UP (ref 3.5–5.3)
POTASSIUM SERPL-SCNC: 3.8 MMOL/L — SIGNIFICANT CHANGE UP (ref 3.5–5.3)
PROT ?TM UR-MCNC: <7 MG/DL — SIGNIFICANT CHANGE UP (ref 0–12)
PROT ?TM UR-MCNC: <7 MG/DL — SIGNIFICANT CHANGE UP (ref 0–12)
PROT SERPL-MCNC: 7 G/DL — SIGNIFICANT CHANGE UP (ref 6–8.3)
PROT SERPL-MCNC: 7 G/DL — SIGNIFICANT CHANGE UP (ref 6–8.3)
PROT UR-MCNC: NEGATIVE MG/DL — SIGNIFICANT CHANGE UP
PROT UR-MCNC: NEGATIVE MG/DL — SIGNIFICANT CHANGE UP
PROT/CREAT UR-RTO: SIGNIFICANT CHANGE UP (ref 0–0.2)
PROT/CREAT UR-RTO: SIGNIFICANT CHANGE UP (ref 0–0.2)
PROTHROM AB SERPL-ACNC: 9.9 SEC — SIGNIFICANT CHANGE UP (ref 9.5–13)
PROTHROM AB SERPL-ACNC: 9.9 SEC — SIGNIFICANT CHANGE UP (ref 9.5–13)
RBC # BLD: 3.74 M/UL — LOW (ref 3.8–5.2)
RBC # BLD: 3.74 M/UL — LOW (ref 3.8–5.2)
RBC # FLD: 12.9 % — SIGNIFICANT CHANGE UP (ref 10.3–14.5)
RBC # FLD: 12.9 % — SIGNIFICANT CHANGE UP (ref 10.3–14.5)
RBC CASTS # UR COMP ASSIST: 3 /HPF — SIGNIFICANT CHANGE UP (ref 0–4)
RBC CASTS # UR COMP ASSIST: 3 /HPF — SIGNIFICANT CHANGE UP (ref 0–4)
REVIEW: SIGNIFICANT CHANGE UP
REVIEW: SIGNIFICANT CHANGE UP
RH IG SCN BLD-IMP: POSITIVE — SIGNIFICANT CHANGE UP
RH IG SCN BLD-IMP: POSITIVE — SIGNIFICANT CHANGE UP
SODIUM SERPL-SCNC: 137 MMOL/L — SIGNIFICANT CHANGE UP (ref 135–145)
SODIUM SERPL-SCNC: 137 MMOL/L — SIGNIFICANT CHANGE UP (ref 135–145)
SP GR SPEC: 1.01 — SIGNIFICANT CHANGE UP (ref 1–1.03)
SP GR SPEC: 1.01 — SIGNIFICANT CHANGE UP (ref 1–1.03)
SQUAMOUS # UR AUTO: 1 /HPF — SIGNIFICANT CHANGE UP (ref 0–5)
SQUAMOUS # UR AUTO: 1 /HPF — SIGNIFICANT CHANGE UP (ref 0–5)
T PALLIDUM AB TITR SER: NEGATIVE — SIGNIFICANT CHANGE UP
T PALLIDUM AB TITR SER: NEGATIVE — SIGNIFICANT CHANGE UP
URATE SERPL-MCNC: 5.2 MG/DL — SIGNIFICANT CHANGE UP (ref 2.5–7)
URATE SERPL-MCNC: 5.2 MG/DL — SIGNIFICANT CHANGE UP (ref 2.5–7)
UROBILINOGEN FLD QL: 0.2 MG/DL — SIGNIFICANT CHANGE UP (ref 0.2–1)
UROBILINOGEN FLD QL: 0.2 MG/DL — SIGNIFICANT CHANGE UP (ref 0.2–1)
WBC # BLD: 8.4 K/UL — SIGNIFICANT CHANGE UP (ref 3.8–10.5)
WBC # BLD: 8.4 K/UL — SIGNIFICANT CHANGE UP (ref 3.8–10.5)
WBC # FLD AUTO: 8.4 K/UL — SIGNIFICANT CHANGE UP (ref 3.8–10.5)
WBC # FLD AUTO: 8.4 K/UL — SIGNIFICANT CHANGE UP (ref 3.8–10.5)
WBC UR QL: 0 /HPF — SIGNIFICANT CHANGE UP (ref 0–5)
WBC UR QL: 0 /HPF — SIGNIFICANT CHANGE UP (ref 0–5)

## 2023-11-09 RX ORDER — TETANUS TOXOID, REDUCED DIPHTHERIA TOXOID AND ACELLULAR PERTUSSIS VACCINE, ADSORBED 5; 2.5; 8; 8; 2.5 [IU]/.5ML; [IU]/.5ML; UG/.5ML; UG/.5ML; UG/.5ML
0.5 SUSPENSION INTRAMUSCULAR ONCE
Refills: 0 | Status: DISCONTINUED | OUTPATIENT
Start: 2023-11-09 | End: 2023-11-15

## 2023-11-09 RX ORDER — ONDANSETRON 8 MG/1
4 TABLET, FILM COATED ORAL ONCE
Refills: 0 | Status: COMPLETED | OUTPATIENT
Start: 2023-11-09 | End: 2023-11-09

## 2023-11-09 RX ORDER — SODIUM CHLORIDE 9 MG/ML
1000 INJECTION, SOLUTION INTRAVENOUS
Refills: 0 | Status: DISCONTINUED | OUTPATIENT
Start: 2023-11-09 | End: 2023-11-09

## 2023-11-09 RX ORDER — BENZOCAINE 10 %
1 GEL (GRAM) MUCOUS MEMBRANE EVERY 6 HOURS
Refills: 0 | Status: DISCONTINUED | OUTPATIENT
Start: 2023-11-09 | End: 2023-11-15

## 2023-11-09 RX ORDER — HYDROCORTISONE 1 %
1 OINTMENT (GRAM) TOPICAL EVERY 6 HOURS
Refills: 0 | Status: DISCONTINUED | OUTPATIENT
Start: 2023-11-09 | End: 2023-11-15

## 2023-11-09 RX ORDER — CHLORHEXIDINE GLUCONATE 213 G/1000ML
1 SOLUTION TOPICAL DAILY
Refills: 0 | Status: DISCONTINUED | OUTPATIENT
Start: 2023-11-09 | End: 2023-11-09

## 2023-11-09 RX ORDER — IBUPROFEN 200 MG
600 TABLET ORAL EVERY 6 HOURS
Refills: 0 | Status: COMPLETED | OUTPATIENT
Start: 2023-11-09 | End: 2024-10-07

## 2023-11-09 RX ORDER — CITRIC ACID/SODIUM CITRATE 300-500 MG
15 SOLUTION, ORAL ORAL EVERY 6 HOURS
Refills: 0 | Status: DISCONTINUED | OUTPATIENT
Start: 2023-11-09 | End: 2023-11-09

## 2023-11-09 RX ORDER — PRAMOXINE HYDROCHLORIDE 150 MG/15G
1 AEROSOL, FOAM RECTAL EVERY 4 HOURS
Refills: 0 | Status: DISCONTINUED | OUTPATIENT
Start: 2023-11-09 | End: 2023-11-15

## 2023-11-09 RX ORDER — OXYTOCIN 10 UNIT/ML
41.67 VIAL (ML) INJECTION
Qty: 20 | Refills: 0 | Status: DISCONTINUED | OUTPATIENT
Start: 2023-11-09 | End: 2023-11-15

## 2023-11-09 RX ORDER — DIBUCAINE 1 %
1 OINTMENT (GRAM) RECTAL EVERY 6 HOURS
Refills: 0 | Status: DISCONTINUED | OUTPATIENT
Start: 2023-11-09 | End: 2023-11-15

## 2023-11-09 RX ORDER — OXYCODONE HYDROCHLORIDE 5 MG/1
5 TABLET ORAL
Refills: 0 | Status: DISCONTINUED | OUTPATIENT
Start: 2023-11-09 | End: 2023-11-15

## 2023-11-09 RX ORDER — KETOROLAC TROMETHAMINE 30 MG/ML
30 SYRINGE (ML) INJECTION ONCE
Refills: 0 | Status: DISCONTINUED | OUTPATIENT
Start: 2023-11-09 | End: 2023-11-09

## 2023-11-09 RX ORDER — OXYCODONE HYDROCHLORIDE 5 MG/1
5 TABLET ORAL ONCE
Refills: 0 | Status: DISCONTINUED | OUTPATIENT
Start: 2023-11-09 | End: 2023-11-15

## 2023-11-09 RX ORDER — OXYTOCIN 10 UNIT/ML
4 VIAL (ML) INJECTION
Qty: 30 | Refills: 0 | Status: DISCONTINUED | OUTPATIENT
Start: 2023-11-09 | End: 2023-11-09

## 2023-11-09 RX ORDER — SODIUM CHLORIDE 9 MG/ML
3 INJECTION INTRAMUSCULAR; INTRAVENOUS; SUBCUTANEOUS EVERY 8 HOURS
Refills: 0 | Status: DISCONTINUED | OUTPATIENT
Start: 2023-11-09 | End: 2023-11-15

## 2023-11-09 RX ORDER — OXYTOCIN 10 UNIT/ML
333.33 VIAL (ML) INJECTION
Qty: 20 | Refills: 0 | Status: DISCONTINUED | OUTPATIENT
Start: 2023-11-09 | End: 2023-11-09

## 2023-11-09 RX ORDER — MAGNESIUM HYDROXIDE 400 MG/1
30 TABLET, CHEWABLE ORAL
Refills: 0 | Status: DISCONTINUED | OUTPATIENT
Start: 2023-11-09 | End: 2023-11-15

## 2023-11-09 RX ORDER — AER TRAVELER 0.5 G/1
1 SOLUTION RECTAL; TOPICAL EVERY 4 HOURS
Refills: 0 | Status: DISCONTINUED | OUTPATIENT
Start: 2023-11-09 | End: 2023-11-15

## 2023-11-09 RX ORDER — LANOLIN
1 OINTMENT (GRAM) TOPICAL EVERY 6 HOURS
Refills: 0 | Status: DISCONTINUED | OUTPATIENT
Start: 2023-11-09 | End: 2023-11-15

## 2023-11-09 RX ORDER — DIPHENHYDRAMINE HCL 50 MG
25 CAPSULE ORAL EVERY 6 HOURS
Refills: 0 | Status: COMPLETED | OUTPATIENT
Start: 2023-11-09 | End: 2024-10-07

## 2023-11-09 RX ORDER — SIMETHICONE 80 MG/1
80 TABLET, CHEWABLE ORAL EVERY 4 HOURS
Refills: 0 | Status: DISCONTINUED | OUTPATIENT
Start: 2023-11-09 | End: 2023-11-15

## 2023-11-09 RX ORDER — ACETAMINOPHEN 500 MG
975 TABLET ORAL
Refills: 0 | Status: DISCONTINUED | OUTPATIENT
Start: 2023-11-09 | End: 2023-11-15

## 2023-11-09 RX ORDER — ONDANSETRON 8 MG/1
4 TABLET, FILM COATED ORAL ONCE
Refills: 0 | Status: COMPLETED | OUTPATIENT
Start: 2023-11-09 | End: 2023-11-10

## 2023-11-09 RX ORDER — ACETAMINOPHEN 500 MG
1000 TABLET ORAL ONCE
Refills: 0 | Status: COMPLETED | OUTPATIENT
Start: 2023-11-09 | End: 2023-11-09

## 2023-11-09 RX ADMIN — ONDANSETRON 4 MILLIGRAM(S): 8 TABLET, FILM COATED ORAL at 18:14

## 2023-11-09 RX ADMIN — SODIUM CHLORIDE 125 MILLILITER(S): 9 INJECTION, SOLUTION INTRAVENOUS at 04:27

## 2023-11-09 RX ADMIN — SODIUM CHLORIDE 125 MILLILITER(S): 9 INJECTION, SOLUTION INTRAVENOUS at 04:40

## 2023-11-09 RX ADMIN — Medication 400 MILLIGRAM(S): at 05:40

## 2023-11-09 RX ADMIN — Medication 1000 MILLIUNIT(S)/MIN: at 21:44

## 2023-11-09 RX ADMIN — Medication 30 MILLIGRAM(S): at 23:55

## 2023-11-09 RX ADMIN — Medication 30 MILLIGRAM(S): at 22:57

## 2023-11-09 RX ADMIN — Medication 4 MILLIUNIT(S)/MIN: at 18:21

## 2023-11-09 NOTE — OB PROVIDER DELIVERY SUMMARY - NSPROVIDERDELIVERYNOTE_OBGYN_ALL_OB_FT
Over an intact perineum a baby boy delivered OA easily. The oropharynx was suctioned before the first breath. The cord was clamped after 1 minute and cut. The baby was given to the mother for skin to skin contact. Cord gases and cord stem cell collection/tissue was obtained. the placenta delivered spontaneously intact. The uterus was firm. The cx and vaginal fornices were intact. The second degree laceration was repaired with 2-0 vicryl rapide.

## 2023-11-09 NOTE — OB RN DELIVERY SUMMARY - NS_SEPSISRSKCALC_OBGYN_ALL_OB_FT
EOS calculated successfully. EOS Risk Factor: 0.5/1000 live births ([facility] national incidence); GA=38w1d; Temp=99.32; ROM=20.1; GBS='Negative'; Antibiotics='No antibiotics or any antibiotics < 2 hrs prior to birth'   EOS calculated successfully. EOS Risk Factor: 0.5/1000 live births ([facility] national incidence); GA=38w1d; Temp=100.4; ROM=20.1; GBS='Negative'; Antibiotics='No antibiotics or any antibiotics < 2 hrs prior to birth'

## 2023-11-09 NOTE — OB PROVIDER H&P - HISTORY OF PRESENT ILLNESS
OB PA Admission H&P    38yoF  @38.1 weeks gestation (MATTHIEU 23) presents for ROR. Pt reports she felt a "pop" followed by small gush of fluid around 130a while laying in bed, followed by continuous slow leakage of fluid since then. Denies vaginal bleeding. Pt also c/o mild irregular contractions occurring every 5 minutes. Endorses +FM. GBS negative. Pt with labile BP on arrival to triage, /88. Pt also c/o headache, however has a long h/o migraines on medication. Pt denies any other concerns.    – PNC: placental calcification. GBS(-). EFW 3200g. Expecting boy.   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); denies h/o fibroids, ovarian cysts   – PMH: h/o migraines; denies h/o HTN, DM, asthma, thyroid disorders, bleeding disorders, h/o blood transfusions   – PSH: (2018) lap cholecystectomy and umbilical hernia repair   – Psych: denies anxiety/depression   – Social: denies alcohol/tobacco/drug use in pregnancy   – Meds: PNV, ASA, Vit D, Miralax, Nortriptyline 30 mg qd    – Allergies: Macrobid (rash)  – Will accept blood transfusions: Yes    Vital Signs Last 24 Hrs  T(C): 36.7 (2023 04:02), Max: 36.7 (2023 03:40)  T(F): 98.1 (2023 04:02), Max: 98.1 (2023 03:50)  HR: 122 (2023 04:20) (108 - 126)  BP: 131/89 (2023 04:10) (131/89 - 140/88)  RR: 18 (2023 04:02) (18 - 18)  SpO2: 96% (2023 04:20) (94% - 99%)    Gen: NAD  CV: RRR  Lungs: CTA b/l   Abd: gravid, non-tender  Ext: BLE non-edematous, no calf tenderness b/l     – Spec: +pooling, clear fluid, +nitrazine, +ferning  – VE: 2/50/-3  – FHT: baseline 145, mod variability, +accels, -decels  – [place]: q2-4min   – EFW: 3200g   – Sono: vertex

## 2023-11-09 NOTE — OB RN PATIENT PROFILE - NS_ACCOMPAINEDBY_OBGYN_ALL_OB
DATE:  07/28/2017



INDICATIONS:  Ms. Niya Gordon is an 87-year-old female with past medical

history significant for hypertension, dyslipidemia, coronary artery disease

and congestive heart failure who was brought to the cardiac cath lab for

evaluation and treatment of symptoms of claudication at rest.  The patient

had undergone a CTA of bilateral lower extremity, which showed high-grade

bilateral SFA stenosis.



PROCEDURE PERFORMED:

1.  Abdominal aortogram with bilateral iliofemoral.

2.  Selective bilateral iliofemoral angiogram with runoff.

3.  6-Panamanian femoral arterial access.

4.  Mynx closure device for hemostasis.



TECHNIQUE FOR PROCEDURE:  After obtaining informed consent, the patient was

brought to the cardiac cath suite in postabsorptive non-sedated state.  The

patient was prepped and draped in the usual sterile fashion.  Lidocaine 2%

was used for infiltration anesthesia.  Using modified Seldinger technique,

a 6-Panamanian sheath was introduced into the left femoral artery.  Left

iliofemoral angiogram with runoff was performed and DSA angiographic views of 
below the knee and a foot

profile was obtained.



The left lower extremity angiographic findings:

1.  Left common iliac moderate 40% ostial stenosis.

2.  External iliac patent SFA diffuse 30% to 40% with mid 60% to 70%

stenosis.

3.  Popliteal artery patent and anterior tibial artery 100% occluded, two

vessel run off below the knee of PT and peronea.



After obtaining the left lower extremity angiogram over a glide wire, Omni

Flush catheter was advanced up to abdominal aorta.  Abdominal aortogram

with bilateral iliofemoral was performed.  Subsequently, the Omni Flush

catheter was crossed across the aortoiliac bifurcation with the right

common femoral artery DSA angiographic views of the right SFA, right popliteal,
  right below the knee and right foot profile  was obtained.



Angiographic findings of the right lower extremity:

1.  Right common iliac moderate 40% stenosis.

2.  External iliac patent stent.

3.  External iliac stenting into the common femoral artery, common femoral,

profunda femoris, patent SFA, proximal 60%, mid 50%, distal 65% stenosis,

popliteal moderate 40% stenosis.  Anterior tibial artery high-grade 90%

stenosis, proximally and high-grade 90% stenosis.  Distally, peroneal

proximal diffuse long 90% stenosis, posterior tibial patent.



IMPRESSION:  Severe bilateral moderate peripheral vascular occlusive

disease.  After obtaining this angiogram, a pullback gradient was obtained 

across the superficial femoral artery from the popliteal and there were no

gradients noted.



RECOMMENDATIONS:  The patient is to be maximized on medical therapy. If her

symptoms continued to progress, may consider reevaluation and doing CSI

arthrectomy, angioplasty of the bilateral SFAs depending on clinical

symptoms and management.





The patient is to follow with Dr. Rene in 1 to 2 weeks.







__________________________________________

Ciaran Rene MD



DD:  08/03/2017 15:06:13

DT:  08/04/2017 12:43:26

Job # [id number]

[id number] Spouse

## 2023-11-09 NOTE — OB RN DELIVERY SUMMARY - AS DELIV COMPLICATIONS OB
none prolonged rupture of membranes prolonged rupture of membranes/premature rupture of membranes prior to labor

## 2023-11-09 NOTE — PRE-ANESTHESIA EVALUATION ADULT - NSANTHOSAYNRD_GEN_A_CORE
No. PRANAY screening performed.  STOP BANG Legend: 0-2 = LOW Risk; 3-4 = INTERMEDIATE Risk; 5-8 = HIGH Risk

## 2023-11-09 NOTE — OB PROVIDER H&P - ASSESSMENT
A/P: 38yoF  @38.1 weeks gestation (MATTHIEU 23) admitted for IOL 2/2 PROM@130a. GBS negative. Pt with labile BPs on arrival, labs pending. Pt c/o headache, h/o migraines 2/2 long covid symptoms per pt.   - Admit to L&D  - Routine Labs. IVF   - EFM/[place]: continuous monitoring   - Close BP monitoring   - Labile BP -> HELLP labs ordered   - IOL with PO Cytotec   - GBS negative   - Elevated PPH risk 2/2 AMA  - Anesthesia consult -> epidural prn   - D/w Dr. Russell Horner, PA-C

## 2023-11-09 NOTE — OB RN PATIENT PROFILE - FALL HARM RISK - UNIVERSAL INTERVENTIONS
Bed in lowest position, wheels locked, appropriate side rails in place/Call bell, personal items and telephone in reach/Instruct patient to call for assistance before getting out of bed or chair/Non-slip footwear when patient is out of bed/[place] to call system/Physically safe environment - no spills, clutter or unnecessary equipment/Purposeful Proactive Rounding/Room/bathroom lighting operational, light cord in reach

## 2023-11-09 NOTE — OB PROVIDER H&P - NS PANP COMMENT GEN_ALL_CORE FT
Ob Attg  Pt has spontaneously ruptured her membranes at about 1:30am today.   Her pregnancy has been mostly uneventful.  Recently she has been watched and tested closely due to low amniotic fluid index noted on sonography.  Pt is currently 2cm dilated.    Induction of labor w/ misoprostol.  pain relief: epidural when needed.  Pt and her partner were counseled re process of induction, r/b/a's.  all q's were answered.

## 2023-11-09 NOTE — OB RN DELIVERY SUMMARY - NSSELHIDDEN_OBGYN_ALL_OB_FT
[[email or web address]_ALL_OB_FT:[email or web address]],[NS_DeliveryRN_OBGYN_ALL_OB_FT:[id number]=]

## 2023-11-09 NOTE — OB PROVIDER LABOR PROGRESS NOTE - ASSESSMENT
37yo  at 38+1 IOL for PROM now with gHTN  - c/w pitocin (at 6 now)     Dr. Estrada notified  MARC Reyes, PGY3 
May start pushing. Watch the tracing closely.

## 2023-11-10 ENCOUNTER — TRANSCRIPTION ENCOUNTER (OUTPATIENT)
Age: 38
End: 2023-11-10

## 2023-11-10 ENCOUNTER — APPOINTMENT (OUTPATIENT)
Dept: ANTEPARTUM | Facility: CLINIC | Age: 38
End: 2023-11-10

## 2023-11-10 RX ORDER — AER TRAVELER 0.5 G/1
1 SOLUTION RECTAL; TOPICAL
Qty: 0 | Refills: 0 | DISCHARGE
Start: 2023-11-10

## 2023-11-10 RX ORDER — ASPIRIN/CALCIUM CARB/MAGNESIUM 324 MG
1 TABLET ORAL
Qty: 0 | Refills: 0 | DISCHARGE

## 2023-11-10 RX ORDER — DIBUCAINE 1 %
1 OINTMENT (GRAM) RECTAL
Qty: 0 | Refills: 0 | DISCHARGE
Start: 2023-11-10

## 2023-11-10 RX ORDER — POLYETHYLENE GLYCOL 3350 17 G/17G
17 POWDER, FOR SOLUTION ORAL DAILY
Refills: 0 | Status: DISCONTINUED | OUTPATIENT
Start: 2023-11-10 | End: 2023-11-12

## 2023-11-10 RX ORDER — BENZOCAINE 10 %
1 GEL (GRAM) MUCOUS MEMBRANE
Qty: 0 | Refills: 0 | DISCHARGE
Start: 2023-11-10

## 2023-11-10 RX ORDER — DOXYLAMINE SUCCINATE AND PYRIDOXINE HYDROCHLORIDE, DELAYED RELEASE TABLETS 10 MG/10 MG 10; 10 MG/1; MG/1
2 TABLET, DELAYED RELEASE ORAL
Refills: 0 | DISCHARGE

## 2023-11-10 RX ORDER — NORTRIPTYLINE HYDROCHLORIDE 10 MG/1
10 CAPSULE ORAL DAILY
Refills: 0 | Status: DISCONTINUED | OUTPATIENT
Start: 2023-11-10 | End: 2023-11-12

## 2023-11-10 RX ORDER — LANOLIN
1 OINTMENT (GRAM) TOPICAL
Qty: 0 | Refills: 0 | DISCHARGE
Start: 2023-11-10

## 2023-11-10 RX ORDER — IBUPROFEN 200 MG
1 TABLET ORAL
Qty: 0 | Refills: 0 | DISCHARGE
Start: 2023-11-10

## 2023-11-10 RX ORDER — ACETAMINOPHEN 500 MG
3 TABLET ORAL
Qty: 0 | Refills: 0 | DISCHARGE
Start: 2023-11-10

## 2023-11-10 RX ORDER — IBUPROFEN 200 MG
600 TABLET ORAL EVERY 6 HOURS
Refills: 0 | Status: DISCONTINUED | OUTPATIENT
Start: 2023-11-10 | End: 2023-11-15

## 2023-11-10 RX ORDER — NORTRIPTYLINE HYDROCHLORIDE 10 MG/1
2 CAPSULE ORAL
Qty: 0 | Refills: 0 | DISCHARGE

## 2023-11-10 RX ADMIN — Medication 600 MILLIGRAM(S): at 18:20

## 2023-11-10 RX ADMIN — Medication 600 MILLIGRAM(S): at 13:00

## 2023-11-10 RX ADMIN — Medication 600 MILLIGRAM(S): at 18:57

## 2023-11-10 RX ADMIN — Medication 1 TABLET(S): at 12:12

## 2023-11-10 RX ADMIN — ONDANSETRON 4 MILLIGRAM(S): 8 TABLET, FILM COATED ORAL at 00:02

## 2023-11-10 RX ADMIN — POLYETHYLENE GLYCOL 3350 17 GRAM(S): 17 POWDER, FOR SOLUTION ORAL at 12:14

## 2023-11-10 RX ADMIN — Medication 600 MILLIGRAM(S): at 06:27

## 2023-11-10 RX ADMIN — Medication 975 MILLIGRAM(S): at 23:00

## 2023-11-10 RX ADMIN — Medication 975 MILLIGRAM(S): at 04:03

## 2023-11-10 RX ADMIN — Medication 975 MILLIGRAM(S): at 09:32

## 2023-11-10 RX ADMIN — Medication 975 MILLIGRAM(S): at 21:59

## 2023-11-10 RX ADMIN — Medication 975 MILLIGRAM(S): at 03:02

## 2023-11-10 RX ADMIN — Medication 600 MILLIGRAM(S): at 12:13

## 2023-11-10 RX ADMIN — OXYCODONE HYDROCHLORIDE 5 MILLIGRAM(S): 5 TABLET ORAL at 14:54

## 2023-11-10 RX ADMIN — OXYCODONE HYDROCHLORIDE 5 MILLIGRAM(S): 5 TABLET ORAL at 15:40

## 2023-11-10 RX ADMIN — NORTRIPTYLINE HYDROCHLORIDE 10 MILLIGRAM(S): 10 CAPSULE ORAL at 21:59

## 2023-11-10 RX ADMIN — Medication 975 MILLIGRAM(S): at 15:45

## 2023-11-10 RX ADMIN — Medication 975 MILLIGRAM(S): at 10:20

## 2023-11-10 RX ADMIN — SODIUM CHLORIDE 3 MILLILITER(S): 9 INJECTION INTRAMUSCULAR; INTRAVENOUS; SUBCUTANEOUS at 05:54

## 2023-11-10 RX ADMIN — Medication 975 MILLIGRAM(S): at 14:53

## 2023-11-10 NOTE — DISCHARGE NOTE OB - CARE PROVIDER_API CALL
Juaquin Estrada  Obstetrics and Gynecology  [address], [address]  [place] [address]  Phone: [phone]  Fax: [phone]  Follow Up Time:

## 2023-11-10 NOTE — DISCHARGE NOTE OB - HOSPITAL COURSE
Pt underwent normal vaginal delivery. Uncomplicated postpartum course. Pt met all milestones in regards to postop labs, activities, diet and pain control. Pt was discharged on PPD #2 in stable condition. All follow up and instructions discussed.   Pt underwent normal vaginal delivery. Uncomplicated postpartum course. Pt met all milestones in regards to postop labs, activities, diet and pain control. Pt was discharged on PPD #2 in stable condition. All follow up and instructions discussed .bps at home 3 x a day.          Call for bps 140/90 or greater, nausea, vomiting, headache that tylenol is not effective,blurry vision or upper abdominal pain.           Telemed in 2 weeks.           Pt underwent normal vaginal delivery. Uncomplicated postpartum course. Pt met all milestones in regards to postop labs, activities, diet and pain control. Pt was discharged on PPD #6 in stable condition. All follow up and instructions discussed .bps at home 3 x a day.          Call for bps 150/100 or greater, nausea, vomiting, headache that tylenol is not effective,blurry vision or upper abdominal pain.           In office visit in 1 week

## 2023-11-10 NOTE — DISCHARGE NOTE OB - MEDICATION SUMMARY - MEDICATIONS TO TAKE
I will START or STAY ON the medications listed below when I get home from the hospital:    ibuprofen 600 mg oral tablet  -- 1 tab(s) by mouth every 6 hours  -- Indication: For Pain    acetaminophen 325 mg oral tablet  -- 3 tab(s) by mouth every 6 hours as needed for  moderate pain  -- Indication: For Pain    benzocaine 20% topical spray  -- 1 Apply on skin to affected area every 6 hours As needed for Perineal discomfort  -- Indication: For Pain    dibucaine 1% topical ointment  -- 1 Apply on skin to affected area every 6 hours As needed Perineal discomfort  -- Indication: For Pain    lanolin topical ointment  -- 1 Apply on skin to affected area every 6 hours As needed nipple soreness  -- Indication: For Breast care    witch hazel 50% topical pad  -- 1 Apply on skin to affected area every 4 hours As needed Perineal discomfort  -- Indication: For Pain    Prenatal Multivitamins with Folic Acid 1 mg oral tablet  -- 1 tab(s) by mouth once a day  -- Indication: For  (normal spontaneous vaginal delivery)   I will START or STAY ON the medications listed below when I get home from the hospital:    ibuprofen 600 mg oral tablet  -- 1 tab(s) by mouth every 6 hours  -- Indication: For Pain    acetaminophen 325 mg oral tablet  -- 3 tab(s) by mouth every 6 hours as needed for  moderate pain  -- Indication: For Pain    labetalol 200 mg oral tablet  -- 1 tab(s) by mouth 3 times a day  -- Indication: For Blood Pressure    NIFEdipine 60 mg oral tablet, extended release  -- 1 tab(s) by mouth once a day  -- Indication: For Blood Pressure    benzocaine 20% topical spray  -- 1 Apply on skin to affected area every 6 hours As needed for Perineal discomfort  -- Indication: For Pain    dibucaine 1% topical ointment  -- 1 Apply on skin to affected area every 6 hours As needed Perineal discomfort  -- Indication: For Pain    lanolin topical ointment  -- 1 Apply on skin to affected area every 6 hours As needed nipple soreness  -- Indication: For Breast care    witch hazel 50% topical pad  -- 1 Apply on skin to affected area every 4 hours As needed Perineal discomfort  -- Indication: For Pain    Prenatal Multivitamins with Folic Acid 1 mg oral tablet  -- 1 tab(s) by mouth once a day  -- Indication: For  (normal spontaneous vaginal delivery)   I will START or STAY ON the medications listed below when I get home from the hospital:    ibuprofen 600 mg oral tablet  -- 1 tab(s) by mouth every 6 hours  -- Indication: For Pain    acetaminophen 325 mg oral tablet  -- 3 tab(s) by mouth every 6 hours as needed for  moderate pain  -- Indication: For Pain    labetalol 200 mg oral tablet  -- 1 tab(s) by mouth 3 times a day Hold BP<100/60 or HR<60  -- Indication: For hypertension    NIFEdipine 60 mg oral tablet, extended release  -- 1 tab(s) by mouth once a day Hold BP<100/60  -- Indication: For hypertension    benzocaine 20% topical spray  -- 1 Apply on skin to affected area every 6 hours As needed for Perineal discomfort  -- Indication: For Pain    dibucaine 1% topical ointment  -- 1 Apply on skin to affected area every 6 hours As needed Perineal discomfort  -- Indication: For Pain    lanolin topical ointment  -- 1 Apply on skin to affected area every 6 hours As needed nipple soreness  -- Indication: For Breast care    witch hazel 50% topical pad  -- 1 Apply on skin to affected area every 4 hours As needed Perineal discomfort  -- Indication: For Pain    Prenatal Multivitamins with Folic Acid 1 mg oral tablet  -- 1 tab(s) by mouth once a day  -- Indication: For  (normal spontaneous vaginal delivery)

## 2023-11-10 NOTE — DISCHARGE NOTE OB - NS MD DC FALL RISK RISK
For information on Fall & Injury Prevention, visit: [email or web address] OR  [email or web address] OR  https://www.cdc.gov/steadi/patient.html

## 2023-11-10 NOTE — DISCHARGE NOTE OB - MEDICATION SUMMARY - MEDICATIONS TO STOP TAKING
I will STOP taking the medications listed below when I get home from the hospital:    aspirin 81 mg oral tablet  -- 1 tab(s) by mouth once a day    doxylamine-pyridoxine 10 mg-10 mg oral delayed release tablet  -- 2 tab(s) orally

## 2023-11-10 NOTE — PROGRESS NOTE ADULT - ASSESSMENT
A/P:  39yo         S/P  primary   section and bilateral salpingectomy   POD # 1, doing well      PAST MEDICAL & SURGICAL HISTORY:  Migraines      Insomnia      HSV infection      History of cholecystectomy      History of umbilical hernia repair        Current Issues: none

## 2023-11-10 NOTE — DISCHARGE NOTE OB - INSTRUCTIONS
any increase in temp 100.4 or above or bleeding call MD or go to Emergency Room S&S of PEC and PBWS reviewed any increase in temp 100.4 or above or bleeding call MD or go to Emergency Room S&S of PEC and PBWS reviewed take and record BP 2x per day if BP is 140/90 call MD if BP is 110/60 or below hold medication x 1 hr then retake BP if it is still below 110/60 call MD

## 2023-11-10 NOTE — DISCHARGE NOTE OB - PATIENT PORTAL LINK FT
You can access the FollowMyHealth Patient Portal offered by [facility] by registering at the following website: [email or web address]. By joining [facility]’s FollowMyHealth portal, you will also be able to view your health information using other applications (apps) compatible with our system.

## 2023-11-10 NOTE — DISCHARGE NOTE OB - HOME CARE AGENCY NAME:
[facility] at home-start of care-11/15/2023 [facility] at home-start of care-11/16/2023

## 2023-11-10 NOTE — DISCHARGE NOTE OB - ADDITIONAL INSTRUCTIONS
Postpartum visit via telemed at 2 weeks and then an in office visit between 4-6 weeks.  Call for any issues or concerns. Please do not drive unless you are completely pain free or not requiring any narcotics. Postpartum visit  in office visit 1 week.  Call for any issues or concerns. Please do not drive unless you are completely pain free or not requiring any narcotics.

## 2023-11-10 NOTE — DISCHARGE NOTE OB - MATERIALS PROVIDED
Back To Sleep Handout/Shaken Baby Prevention Handout/Breastfeeding Guide and Packet/Birth Certificate Instructions

## 2023-11-11 LAB
ALBUMIN SERPL ELPH-MCNC: 3.3 G/DL — SIGNIFICANT CHANGE UP (ref 3.3–5)
ALBUMIN SERPL ELPH-MCNC: 3.3 G/DL — SIGNIFICANT CHANGE UP (ref 3.3–5)
ALP SERPL-CCNC: 125 U/L — HIGH (ref 40–120)
ALP SERPL-CCNC: 125 U/L — HIGH (ref 40–120)
ALT FLD-CCNC: 13 U/L — SIGNIFICANT CHANGE UP (ref 10–45)
ALT FLD-CCNC: 13 U/L — SIGNIFICANT CHANGE UP (ref 10–45)
ANION GAP SERPL CALC-SCNC: 10 MMOL/L — SIGNIFICANT CHANGE UP (ref 5–17)
ANION GAP SERPL CALC-SCNC: 10 MMOL/L — SIGNIFICANT CHANGE UP (ref 5–17)
APTT BLD: 27.4 SEC — SIGNIFICANT CHANGE UP (ref 24.5–35.6)
APTT BLD: 27.4 SEC — SIGNIFICANT CHANGE UP (ref 24.5–35.6)
AST SERPL-CCNC: 28 U/L — SIGNIFICANT CHANGE UP (ref 10–40)
AST SERPL-CCNC: 28 U/L — SIGNIFICANT CHANGE UP (ref 10–40)
BASOPHILS # BLD AUTO: 0.06 K/UL — SIGNIFICANT CHANGE UP (ref 0–0.2)
BASOPHILS # BLD AUTO: 0.06 K/UL — SIGNIFICANT CHANGE UP (ref 0–0.2)
BASOPHILS NFR BLD AUTO: 0.5 % — SIGNIFICANT CHANGE UP (ref 0–2)
BASOPHILS NFR BLD AUTO: 0.5 % — SIGNIFICANT CHANGE UP (ref 0–2)
BILIRUB SERPL-MCNC: 0.1 MG/DL — LOW (ref 0.2–1.2)
BILIRUB SERPL-MCNC: 0.1 MG/DL — LOW (ref 0.2–1.2)
BUN SERPL-MCNC: 10 MG/DL — SIGNIFICANT CHANGE UP (ref 7–23)
BUN SERPL-MCNC: 10 MG/DL — SIGNIFICANT CHANGE UP (ref 7–23)
CALCIUM SERPL-MCNC: 8.8 MG/DL — SIGNIFICANT CHANGE UP (ref 8.4–10.5)
CALCIUM SERPL-MCNC: 8.8 MG/DL — SIGNIFICANT CHANGE UP (ref 8.4–10.5)
CHLORIDE SERPL-SCNC: 105 MMOL/L — SIGNIFICANT CHANGE UP (ref 96–108)
CHLORIDE SERPL-SCNC: 105 MMOL/L — SIGNIFICANT CHANGE UP (ref 96–108)
CO2 SERPL-SCNC: 24 MMOL/L — SIGNIFICANT CHANGE UP (ref 22–31)
CO2 SERPL-SCNC: 24 MMOL/L — SIGNIFICANT CHANGE UP (ref 22–31)
CREAT SERPL-MCNC: 0.66 MG/DL — SIGNIFICANT CHANGE UP (ref 0.5–1.3)
CREAT SERPL-MCNC: 0.66 MG/DL — SIGNIFICANT CHANGE UP (ref 0.5–1.3)
EGFR: 117 ML/MIN/1.73M2 — SIGNIFICANT CHANGE UP
EGFR: 117 ML/MIN/1.73M2 — SIGNIFICANT CHANGE UP
EOSINOPHIL # BLD AUTO: 0.08 K/UL — SIGNIFICANT CHANGE UP (ref 0–0.5)
EOSINOPHIL # BLD AUTO: 0.08 K/UL — SIGNIFICANT CHANGE UP (ref 0–0.5)
EOSINOPHIL NFR BLD AUTO: 0.7 % — SIGNIFICANT CHANGE UP (ref 0–6)
EOSINOPHIL NFR BLD AUTO: 0.7 % — SIGNIFICANT CHANGE UP (ref 0–6)
FIBRINOGEN PPP-MCNC: 537 MG/DL — HIGH (ref 200–445)
FIBRINOGEN PPP-MCNC: 537 MG/DL — HIGH (ref 200–445)
GLUCOSE SERPL-MCNC: 72 MG/DL — SIGNIFICANT CHANGE UP (ref 70–99)
GLUCOSE SERPL-MCNC: 72 MG/DL — SIGNIFICANT CHANGE UP (ref 70–99)
HCT VFR BLD CALC: 29.1 % — LOW (ref 34.5–45)
HCT VFR BLD CALC: 29.1 % — LOW (ref 34.5–45)
HGB BLD-MCNC: 10.1 G/DL — LOW (ref 11.5–15.5)
HGB BLD-MCNC: 10.1 G/DL — LOW (ref 11.5–15.5)
IMM GRANULOCYTES NFR BLD AUTO: 0.4 % — SIGNIFICANT CHANGE UP (ref 0–0.9)
IMM GRANULOCYTES NFR BLD AUTO: 0.4 % — SIGNIFICANT CHANGE UP (ref 0–0.9)
INR BLD: 0.87 RATIO — SIGNIFICANT CHANGE UP (ref 0.85–1.18)
INR BLD: 0.87 RATIO — SIGNIFICANT CHANGE UP (ref 0.85–1.18)
LDH SERPL L TO P-CCNC: 210 U/L — SIGNIFICANT CHANGE UP (ref 50–242)
LDH SERPL L TO P-CCNC: 210 U/L — SIGNIFICANT CHANGE UP (ref 50–242)
LYMPHOCYTES # BLD AUTO: 2.33 K/UL — SIGNIFICANT CHANGE UP (ref 1–3.3)
LYMPHOCYTES # BLD AUTO: 2.33 K/UL — SIGNIFICANT CHANGE UP (ref 1–3.3)
LYMPHOCYTES # BLD AUTO: 20 % — SIGNIFICANT CHANGE UP (ref 13–44)
LYMPHOCYTES # BLD AUTO: 20 % — SIGNIFICANT CHANGE UP (ref 13–44)
MCHC RBC-ENTMCNC: 31.6 PG — SIGNIFICANT CHANGE UP (ref 27–34)
MCHC RBC-ENTMCNC: 31.6 PG — SIGNIFICANT CHANGE UP (ref 27–34)
MCHC RBC-ENTMCNC: 34.7 GM/DL — SIGNIFICANT CHANGE UP (ref 32–36)
MCHC RBC-ENTMCNC: 34.7 GM/DL — SIGNIFICANT CHANGE UP (ref 32–36)
MCV RBC AUTO: 90.9 FL — SIGNIFICANT CHANGE UP (ref 80–100)
MCV RBC AUTO: 90.9 FL — SIGNIFICANT CHANGE UP (ref 80–100)
MONOCYTES # BLD AUTO: 0.48 K/UL — SIGNIFICANT CHANGE UP (ref 0–0.9)
MONOCYTES # BLD AUTO: 0.48 K/UL — SIGNIFICANT CHANGE UP (ref 0–0.9)
MONOCYTES NFR BLD AUTO: 4.1 % — SIGNIFICANT CHANGE UP (ref 2–14)
MONOCYTES NFR BLD AUTO: 4.1 % — SIGNIFICANT CHANGE UP (ref 2–14)
NEUTROPHILS # BLD AUTO: 8.63 K/UL — HIGH (ref 1.8–7.4)
NEUTROPHILS # BLD AUTO: 8.63 K/UL — HIGH (ref 1.8–7.4)
NEUTROPHILS NFR BLD AUTO: 74.3 % — SIGNIFICANT CHANGE UP (ref 43–77)
NEUTROPHILS NFR BLD AUTO: 74.3 % — SIGNIFICANT CHANGE UP (ref 43–77)
NRBC # BLD: 0 /100 WBCS — SIGNIFICANT CHANGE UP (ref 0–0)
NRBC # BLD: 0 /100 WBCS — SIGNIFICANT CHANGE UP (ref 0–0)
PLATELET # BLD AUTO: 245 K/UL — SIGNIFICANT CHANGE UP (ref 150–400)
PLATELET # BLD AUTO: 245 K/UL — SIGNIFICANT CHANGE UP (ref 150–400)
POTASSIUM SERPL-MCNC: 4 MMOL/L — SIGNIFICANT CHANGE UP (ref 3.5–5.3)
POTASSIUM SERPL-MCNC: 4 MMOL/L — SIGNIFICANT CHANGE UP (ref 3.5–5.3)
POTASSIUM SERPL-SCNC: 4 MMOL/L — SIGNIFICANT CHANGE UP (ref 3.5–5.3)
POTASSIUM SERPL-SCNC: 4 MMOL/L — SIGNIFICANT CHANGE UP (ref 3.5–5.3)
PROT SERPL-MCNC: 6.1 G/DL — SIGNIFICANT CHANGE UP (ref 6–8.3)
PROT SERPL-MCNC: 6.1 G/DL — SIGNIFICANT CHANGE UP (ref 6–8.3)
PROTHROM AB SERPL-ACNC: 9.6 SEC — SIGNIFICANT CHANGE UP (ref 9.5–13)
PROTHROM AB SERPL-ACNC: 9.6 SEC — SIGNIFICANT CHANGE UP (ref 9.5–13)
RBC # BLD: 3.2 M/UL — LOW (ref 3.8–5.2)
RBC # BLD: 3.2 M/UL — LOW (ref 3.8–5.2)
RBC # FLD: 13.2 % — SIGNIFICANT CHANGE UP (ref 10.3–14.5)
RBC # FLD: 13.2 % — SIGNIFICANT CHANGE UP (ref 10.3–14.5)
SODIUM SERPL-SCNC: 139 MMOL/L — SIGNIFICANT CHANGE UP (ref 135–145)
SODIUM SERPL-SCNC: 139 MMOL/L — SIGNIFICANT CHANGE UP (ref 135–145)
URATE SERPL-MCNC: 5.4 MG/DL — SIGNIFICANT CHANGE UP (ref 2.5–7)
URATE SERPL-MCNC: 5.4 MG/DL — SIGNIFICANT CHANGE UP (ref 2.5–7)
WBC # BLD: 11.63 K/UL — HIGH (ref 3.8–10.5)
WBC # BLD: 11.63 K/UL — HIGH (ref 3.8–10.5)
WBC # FLD AUTO: 11.63 K/UL — HIGH (ref 3.8–10.5)
WBC # FLD AUTO: 11.63 K/UL — HIGH (ref 3.8–10.5)

## 2023-11-11 RX ORDER — MAGNESIUM SULFATE 500 MG/ML
2 VIAL (ML) INJECTION
Qty: 40 | Refills: 0 | Status: DISCONTINUED | OUTPATIENT
Start: 2023-11-11 | End: 2023-11-12

## 2023-11-11 RX ORDER — MAGNESIUM SULFATE 500 MG/ML
4 VIAL (ML) INJECTION ONCE
Refills: 0 | Status: COMPLETED | OUTPATIENT
Start: 2023-11-11 | End: 2023-11-12

## 2023-11-11 RX ORDER — NIFEDIPINE 30 MG
30 TABLET, EXTENDED RELEASE 24 HR ORAL DAILY
Refills: 0 | Status: DISCONTINUED | OUTPATIENT
Start: 2023-11-11 | End: 2023-11-12

## 2023-11-11 RX ORDER — DIPHENHYDRAMINE HCL 50 MG
25 CAPSULE ORAL EVERY 6 HOURS
Refills: 0 | Status: DISCONTINUED | OUTPATIENT
Start: 2023-11-11 | End: 2023-11-15

## 2023-11-11 RX ADMIN — Medication 975 MILLIGRAM(S): at 08:59

## 2023-11-11 RX ADMIN — Medication 975 MILLIGRAM(S): at 21:19

## 2023-11-11 RX ADMIN — Medication 975 MILLIGRAM(S): at 03:39

## 2023-11-11 RX ADMIN — Medication 975 MILLIGRAM(S): at 04:38

## 2023-11-11 RX ADMIN — POLYETHYLENE GLYCOL 3350 17 GRAM(S): 17 POWDER, FOR SOLUTION ORAL at 11:55

## 2023-11-11 RX ADMIN — Medication 600 MILLIGRAM(S): at 00:26

## 2023-11-11 RX ADMIN — Medication 975 MILLIGRAM(S): at 21:50

## 2023-11-11 RX ADMIN — Medication 600 MILLIGRAM(S): at 05:01

## 2023-11-11 RX ADMIN — Medication 975 MILLIGRAM(S): at 17:01

## 2023-11-11 RX ADMIN — Medication 600 MILLIGRAM(S): at 18:20

## 2023-11-11 RX ADMIN — Medication 1 TABLET(S): at 11:55

## 2023-11-11 RX ADMIN — Medication 600 MILLIGRAM(S): at 17:31

## 2023-11-11 RX ADMIN — Medication 600 MILLIGRAM(S): at 11:55

## 2023-11-11 RX ADMIN — Medication 975 MILLIGRAM(S): at 09:40

## 2023-11-11 RX ADMIN — NORTRIPTYLINE HYDROCHLORIDE 10 MILLIGRAM(S): 10 CAPSULE ORAL at 22:18

## 2023-11-11 RX ADMIN — Medication 30 MILLIGRAM(S): at 17:12

## 2023-11-11 RX ADMIN — Medication 975 MILLIGRAM(S): at 16:07

## 2023-11-11 RX ADMIN — Medication 600 MILLIGRAM(S): at 12:40

## 2023-11-11 RX ADMIN — Medication 600 MILLIGRAM(S): at 01:34

## 2023-11-11 NOTE — CHART NOTE - NSCHARTNOTEFT_GEN_A_CORE
R4 Chart Note     Patient seen and evaluated at bedside. Reports that she has had persistent HA since delivery, now improved. Also endorses "cloudy" vision. Patient states that she has chronic HA and visual changes since having COVID 2y ago. Visual changes usually accompany HA.     Patient's BPs also persistently elevated 140s-150s/90s-100s. Denies chest pain, SOB, epigastric/RUQ pain.     Gen: well appearing, NAD   Resp: Nl work of breathing   Abd: soft, nontender     A/P:   PPD#2 s/p  with BP elevations and symptoms of preeclampsia. Patient with chronic HA/visual changes however given persistence of these sxs since delivery as well as elevations in BP, will treat as sPEC.     - MgSO4   - Repeat HELLP labs   - BP monitoring per routine   - c/w Procardia 30XL, uptitrate as needed   - CT head ordered to evaluate sxs     Anny Hardy MD PGY4   d/w Dr. Estrada

## 2023-11-11 NOTE — PROGRESS NOTE ADULT - PROBLEM SELECTOR PLAN 1
Increase OOB  DVT ppx  Regular diet  AM CBC  Routine Postpartum/Post-op care    Sarah Moreau PA-C
Increase OOB  Regular diet  Continue to monitor BP's  PO Pain protocol  Continue routine post partum care
Increase OOB  Regular diet  Continue to monitor BP's  PO Pain protocol  Continue with routine post partum care

## 2023-11-12 LAB
ALBUMIN SERPL ELPH-MCNC: 3.5 G/DL — SIGNIFICANT CHANGE UP (ref 3.3–5)
ALBUMIN SERPL ELPH-MCNC: 3.5 G/DL — SIGNIFICANT CHANGE UP (ref 3.3–5)
ALP SERPL-CCNC: 135 U/L — HIGH (ref 40–120)
ALP SERPL-CCNC: 135 U/L — HIGH (ref 40–120)
ALT FLD-CCNC: 18 U/L — SIGNIFICANT CHANGE UP (ref 10–45)
ALT FLD-CCNC: 18 U/L — SIGNIFICANT CHANGE UP (ref 10–45)
ANION GAP SERPL CALC-SCNC: 13 MMOL/L — SIGNIFICANT CHANGE UP (ref 5–17)
ANION GAP SERPL CALC-SCNC: 13 MMOL/L — SIGNIFICANT CHANGE UP (ref 5–17)
APTT BLD: 27.4 SEC — SIGNIFICANT CHANGE UP (ref 24.5–35.6)
APTT BLD: 27.4 SEC — SIGNIFICANT CHANGE UP (ref 24.5–35.6)
AST SERPL-CCNC: 32 U/L — SIGNIFICANT CHANGE UP (ref 10–40)
AST SERPL-CCNC: 32 U/L — SIGNIFICANT CHANGE UP (ref 10–40)
BASOPHILS # BLD AUTO: 0.05 K/UL — SIGNIFICANT CHANGE UP (ref 0–0.2)
BASOPHILS # BLD AUTO: 0.05 K/UL — SIGNIFICANT CHANGE UP (ref 0–0.2)
BASOPHILS NFR BLD AUTO: 0.5 % — SIGNIFICANT CHANGE UP (ref 0–2)
BASOPHILS NFR BLD AUTO: 0.5 % — SIGNIFICANT CHANGE UP (ref 0–2)
BILIRUB SERPL-MCNC: 0.1 MG/DL — LOW (ref 0.2–1.2)
BILIRUB SERPL-MCNC: 0.1 MG/DL — LOW (ref 0.2–1.2)
BUN SERPL-MCNC: 7 MG/DL — SIGNIFICANT CHANGE UP (ref 7–23)
BUN SERPL-MCNC: 7 MG/DL — SIGNIFICANT CHANGE UP (ref 7–23)
CALCIUM SERPL-MCNC: 7.3 MG/DL — LOW (ref 8.4–10.5)
CALCIUM SERPL-MCNC: 7.3 MG/DL — LOW (ref 8.4–10.5)
CHLORIDE SERPL-SCNC: 102 MMOL/L — SIGNIFICANT CHANGE UP (ref 96–108)
CHLORIDE SERPL-SCNC: 102 MMOL/L — SIGNIFICANT CHANGE UP (ref 96–108)
CO2 SERPL-SCNC: 24 MMOL/L — SIGNIFICANT CHANGE UP (ref 22–31)
CO2 SERPL-SCNC: 24 MMOL/L — SIGNIFICANT CHANGE UP (ref 22–31)
CREAT SERPL-MCNC: 0.61 MG/DL — SIGNIFICANT CHANGE UP (ref 0.5–1.3)
CREAT SERPL-MCNC: 0.61 MG/DL — SIGNIFICANT CHANGE UP (ref 0.5–1.3)
EGFR: 119 ML/MIN/1.73M2 — SIGNIFICANT CHANGE UP
EGFR: 119 ML/MIN/1.73M2 — SIGNIFICANT CHANGE UP
EOSINOPHIL # BLD AUTO: 0.07 K/UL — SIGNIFICANT CHANGE UP (ref 0–0.5)
EOSINOPHIL # BLD AUTO: 0.07 K/UL — SIGNIFICANT CHANGE UP (ref 0–0.5)
EOSINOPHIL NFR BLD AUTO: 0.7 % — SIGNIFICANT CHANGE UP (ref 0–6)
EOSINOPHIL NFR BLD AUTO: 0.7 % — SIGNIFICANT CHANGE UP (ref 0–6)
FIBRINOGEN PPP-MCNC: 600 MG/DL — HIGH (ref 200–445)
FIBRINOGEN PPP-MCNC: 600 MG/DL — HIGH (ref 200–445)
GLUCOSE SERPL-MCNC: 135 MG/DL — HIGH (ref 70–99)
GLUCOSE SERPL-MCNC: 135 MG/DL — HIGH (ref 70–99)
HCT VFR BLD CALC: 32.6 % — LOW (ref 34.5–45)
HCT VFR BLD CALC: 32.6 % — LOW (ref 34.5–45)
HGB BLD-MCNC: 11.2 G/DL — LOW (ref 11.5–15.5)
HGB BLD-MCNC: 11.2 G/DL — LOW (ref 11.5–15.5)
IMM GRANULOCYTES NFR BLD AUTO: 0.6 % — SIGNIFICANT CHANGE UP (ref 0–0.9)
IMM GRANULOCYTES NFR BLD AUTO: 0.6 % — SIGNIFICANT CHANGE UP (ref 0–0.9)
INR BLD: 0.88 RATIO — SIGNIFICANT CHANGE UP (ref 0.85–1.18)
INR BLD: 0.88 RATIO — SIGNIFICANT CHANGE UP (ref 0.85–1.18)
LDH SERPL L TO P-CCNC: 249 U/L — HIGH (ref 50–242)
LDH SERPL L TO P-CCNC: 249 U/L — HIGH (ref 50–242)
LYMPHOCYTES # BLD AUTO: 1.5 K/UL — SIGNIFICANT CHANGE UP (ref 1–3.3)
LYMPHOCYTES # BLD AUTO: 1.5 K/UL — SIGNIFICANT CHANGE UP (ref 1–3.3)
LYMPHOCYTES # BLD AUTO: 15.1 % — SIGNIFICANT CHANGE UP (ref 13–44)
LYMPHOCYTES # BLD AUTO: 15.1 % — SIGNIFICANT CHANGE UP (ref 13–44)
MAGNESIUM SERPL-MCNC: 5.2 MG/DL — HIGH (ref 1.6–2.6)
MAGNESIUM SERPL-MCNC: 5.2 MG/DL — HIGH (ref 1.6–2.6)
MAGNESIUM SERPL-MCNC: 6.2 MG/DL — HIGH (ref 1.6–2.6)
MCHC RBC-ENTMCNC: 31.3 PG — SIGNIFICANT CHANGE UP (ref 27–34)
MCHC RBC-ENTMCNC: 31.3 PG — SIGNIFICANT CHANGE UP (ref 27–34)
MCHC RBC-ENTMCNC: 34.4 GM/DL — SIGNIFICANT CHANGE UP (ref 32–36)
MCHC RBC-ENTMCNC: 34.4 GM/DL — SIGNIFICANT CHANGE UP (ref 32–36)
MCV RBC AUTO: 91.1 FL — SIGNIFICANT CHANGE UP (ref 80–100)
MCV RBC AUTO: 91.1 FL — SIGNIFICANT CHANGE UP (ref 80–100)
MONOCYTES # BLD AUTO: 0.37 K/UL — SIGNIFICANT CHANGE UP (ref 0–0.9)
MONOCYTES # BLD AUTO: 0.37 K/UL — SIGNIFICANT CHANGE UP (ref 0–0.9)
MONOCYTES NFR BLD AUTO: 3.7 % — SIGNIFICANT CHANGE UP (ref 2–14)
MONOCYTES NFR BLD AUTO: 3.7 % — SIGNIFICANT CHANGE UP (ref 2–14)
NEUTROPHILS # BLD AUTO: 7.89 K/UL — HIGH (ref 1.8–7.4)
NEUTROPHILS # BLD AUTO: 7.89 K/UL — HIGH (ref 1.8–7.4)
NEUTROPHILS NFR BLD AUTO: 79.4 % — HIGH (ref 43–77)
NEUTROPHILS NFR BLD AUTO: 79.4 % — HIGH (ref 43–77)
NRBC # BLD: 0 /100 WBCS — SIGNIFICANT CHANGE UP (ref 0–0)
NRBC # BLD: 0 /100 WBCS — SIGNIFICANT CHANGE UP (ref 0–0)
PLATELET # BLD AUTO: 310 K/UL — SIGNIFICANT CHANGE UP (ref 150–400)
PLATELET # BLD AUTO: 310 K/UL — SIGNIFICANT CHANGE UP (ref 150–400)
POTASSIUM SERPL-MCNC: 3.8 MMOL/L — SIGNIFICANT CHANGE UP (ref 3.5–5.3)
POTASSIUM SERPL-MCNC: 3.8 MMOL/L — SIGNIFICANT CHANGE UP (ref 3.5–5.3)
POTASSIUM SERPL-SCNC: 3.8 MMOL/L — SIGNIFICANT CHANGE UP (ref 3.5–5.3)
POTASSIUM SERPL-SCNC: 3.8 MMOL/L — SIGNIFICANT CHANGE UP (ref 3.5–5.3)
PROT SERPL-MCNC: 6.6 G/DL — SIGNIFICANT CHANGE UP (ref 6–8.3)
PROT SERPL-MCNC: 6.6 G/DL — SIGNIFICANT CHANGE UP (ref 6–8.3)
PROTHROM AB SERPL-ACNC: 9.3 SEC — LOW (ref 9.5–13)
PROTHROM AB SERPL-ACNC: 9.3 SEC — LOW (ref 9.5–13)
RBC # BLD: 3.58 M/UL — LOW (ref 3.8–5.2)
RBC # BLD: 3.58 M/UL — LOW (ref 3.8–5.2)
RBC # FLD: 13 % — SIGNIFICANT CHANGE UP (ref 10.3–14.5)
RBC # FLD: 13 % — SIGNIFICANT CHANGE UP (ref 10.3–14.5)
SODIUM SERPL-SCNC: 139 MMOL/L — SIGNIFICANT CHANGE UP (ref 135–145)
SODIUM SERPL-SCNC: 139 MMOL/L — SIGNIFICANT CHANGE UP (ref 135–145)
URATE SERPL-MCNC: 5.8 MG/DL — SIGNIFICANT CHANGE UP (ref 2.5–7)
URATE SERPL-MCNC: 5.8 MG/DL — SIGNIFICANT CHANGE UP (ref 2.5–7)
WBC # BLD: 9.94 K/UL — SIGNIFICANT CHANGE UP (ref 3.8–10.5)
WBC # BLD: 9.94 K/UL — SIGNIFICANT CHANGE UP (ref 3.8–10.5)
WBC # FLD AUTO: 9.94 K/UL — SIGNIFICANT CHANGE UP (ref 3.8–10.5)
WBC # FLD AUTO: 9.94 K/UL — SIGNIFICANT CHANGE UP (ref 3.8–10.5)

## 2023-11-12 RX ORDER — FAMOTIDINE 10 MG/ML
20 INJECTION INTRAVENOUS ONCE
Refills: 0 | Status: COMPLETED | OUTPATIENT
Start: 2023-11-12 | End: 2023-11-12

## 2023-11-12 RX ORDER — VALACYCLOVIR 500 MG/1
500 TABLET, FILM COATED ORAL DAILY
Refills: 0 | Status: DISCONTINUED | OUTPATIENT
Start: 2023-11-12 | End: 2023-11-12

## 2023-11-12 RX ORDER — SODIUM CHLORIDE 9 MG/ML
1000 INJECTION, SOLUTION INTRAVENOUS
Refills: 0 | Status: DISCONTINUED | OUTPATIENT
Start: 2023-11-12 | End: 2023-11-15

## 2023-11-12 RX ORDER — CALCIUM CARBONATE 500(1250)
1 TABLET ORAL EVERY 6 HOURS
Refills: 0 | Status: DISCONTINUED | OUTPATIENT
Start: 2023-11-12 | End: 2023-11-15

## 2023-11-12 RX ORDER — NORTRIPTYLINE HYDROCHLORIDE 10 MG/1
20 CAPSULE ORAL AT BEDTIME
Refills: 0 | Status: DISCONTINUED | OUTPATIENT
Start: 2023-11-12 | End: 2023-11-15

## 2023-11-12 RX ORDER — NIFEDIPINE 30 MG
60 TABLET, EXTENDED RELEASE 24 HR ORAL DAILY
Refills: 0 | Status: DISCONTINUED | OUTPATIENT
Start: 2023-11-12 | End: 2023-11-15

## 2023-11-12 RX ORDER — NIFEDIPINE 30 MG
30 TABLET, EXTENDED RELEASE 24 HR ORAL ONCE
Refills: 0 | Status: COMPLETED | OUTPATIENT
Start: 2023-11-12 | End: 2023-11-12

## 2023-11-12 RX ORDER — MAGNESIUM SULFATE 500 MG/ML
1 VIAL (ML) INJECTION
Qty: 40 | Refills: 0 | Status: DISCONTINUED | OUTPATIENT
Start: 2023-11-12 | End: 2023-11-15

## 2023-11-12 RX ADMIN — Medication 975 MILLIGRAM(S): at 15:31

## 2023-11-12 RX ADMIN — FAMOTIDINE 20 MILLIGRAM(S): 10 INJECTION INTRAVENOUS at 20:35

## 2023-11-12 RX ADMIN — NORTRIPTYLINE HYDROCHLORIDE 20 MILLIGRAM(S): 10 CAPSULE ORAL at 22:02

## 2023-11-12 RX ADMIN — Medication 600 MILLIGRAM(S): at 13:21

## 2023-11-12 RX ADMIN — Medication 975 MILLIGRAM(S): at 20:37

## 2023-11-12 RX ADMIN — Medication 975 MILLIGRAM(S): at 16:53

## 2023-11-12 RX ADMIN — Medication 600 MILLIGRAM(S): at 18:38

## 2023-11-12 RX ADMIN — Medication 25 MILLIGRAM(S): at 23:42

## 2023-11-12 RX ADMIN — Medication 30 MILLIGRAM(S): at 01:14

## 2023-11-12 RX ADMIN — Medication 975 MILLIGRAM(S): at 09:40

## 2023-11-12 RX ADMIN — Medication 600 MILLIGRAM(S): at 12:48

## 2023-11-12 RX ADMIN — Medication 50 GM/HR: at 00:27

## 2023-11-12 RX ADMIN — Medication 600 MILLIGRAM(S): at 06:12

## 2023-11-12 RX ADMIN — Medication 975 MILLIGRAM(S): at 02:58

## 2023-11-12 RX ADMIN — Medication 975 MILLIGRAM(S): at 21:30

## 2023-11-12 RX ADMIN — Medication 600 MILLIGRAM(S): at 17:44

## 2023-11-12 RX ADMIN — Medication 975 MILLIGRAM(S): at 03:58

## 2023-11-12 RX ADMIN — Medication 60 MILLIGRAM(S): at 17:44

## 2023-11-12 RX ADMIN — Medication 1 TABLET(S): at 12:48

## 2023-11-12 RX ADMIN — Medication 975 MILLIGRAM(S): at 10:23

## 2023-11-12 RX ADMIN — Medication 300 GRAM(S): at 00:02

## 2023-11-12 RX ADMIN — Medication 600 MILLIGRAM(S): at 23:42

## 2023-11-12 NOTE — PROGRESS NOTE ADULT - ASSESSMENT
A/P: 37yo PPD #3 s/p .  Patient is stable and doing well post-partum.     #sPEC  - elevated bps to 150/100 with persistent headaches  - on magnesium for 24hrs (- )  - HELLP wnl  5p, next set at  6a  - Uptitrated to [id number], BPs improving    #HA vs migraine  - CT Head ordered given HA corresponds to elevated BPs  - consider increasing nortriptyline 10mg to home dosage of 20mg qHS    #routine pp care  - Pain well controlled, continue current pain regimen. Standing Ibuprofen, Acetaminophen. Oxycodone available PRN for breakthrough pain.  - Increase ambulation, SCDs when not ambulating  - Continue regular diet    Amyeo Afroz Jereen, PGY-3 A/P: 39yo PPD #3 s/p .  Patient is stable and doing well post-partum.     #sPEC  - elevated bps to 150/100 with persistent headaches  - on magnesium for 24hrs (- )  - HELLP wnl  5p, next set at  12a  - Uptitrated to [id number], BPs improving    #HA vs migraine  - CT Head ordered given HA corresponds to elevated BPs  - consider increasing nortriptyline 10mg to home dosage of 20mg qHS    #routine pp care  - Pain well controlled, continue current pain regimen. Standing Ibuprofen, Acetaminophen. Oxycodone available PRN for breakthrough pain.  - Increase ambulation, SCDs when not ambulating  - Continue regular diet    Amyeo Afroz Jereen, PGY-3

## 2023-11-13 ENCOUNTER — APPOINTMENT (OUTPATIENT)
Dept: ANTEPARTUM | Facility: CLINIC | Age: 38
End: 2023-11-13

## 2023-11-13 LAB
ALBUMIN SERPL ELPH-MCNC: 3.1 G/DL — LOW (ref 3.3–5)
ALBUMIN SERPL ELPH-MCNC: 3.1 G/DL — LOW (ref 3.3–5)
ALP SERPL-CCNC: 115 U/L — SIGNIFICANT CHANGE UP (ref 40–120)
ALP SERPL-CCNC: 115 U/L — SIGNIFICANT CHANGE UP (ref 40–120)
ALT FLD-CCNC: 30 U/L — SIGNIFICANT CHANGE UP (ref 10–45)
ALT FLD-CCNC: 30 U/L — SIGNIFICANT CHANGE UP (ref 10–45)
ANION GAP SERPL CALC-SCNC: 11 MMOL/L — SIGNIFICANT CHANGE UP (ref 5–17)
ANION GAP SERPL CALC-SCNC: 11 MMOL/L — SIGNIFICANT CHANGE UP (ref 5–17)
APTT BLD: 27.5 SEC — SIGNIFICANT CHANGE UP (ref 24.5–35.6)
APTT BLD: 27.5 SEC — SIGNIFICANT CHANGE UP (ref 24.5–35.6)
AST SERPL-CCNC: 47 U/L — HIGH (ref 10–40)
AST SERPL-CCNC: 47 U/L — HIGH (ref 10–40)
BASOPHILS # BLD AUTO: 0.04 K/UL — SIGNIFICANT CHANGE UP (ref 0–0.2)
BASOPHILS # BLD AUTO: 0.04 K/UL — SIGNIFICANT CHANGE UP (ref 0–0.2)
BASOPHILS NFR BLD AUTO: 0.5 % — SIGNIFICANT CHANGE UP (ref 0–2)
BASOPHILS NFR BLD AUTO: 0.5 % — SIGNIFICANT CHANGE UP (ref 0–2)
BILIRUB SERPL-MCNC: 0.1 MG/DL — LOW (ref 0.2–1.2)
BILIRUB SERPL-MCNC: 0.1 MG/DL — LOW (ref 0.2–1.2)
BUN SERPL-MCNC: 9 MG/DL — SIGNIFICANT CHANGE UP (ref 7–23)
BUN SERPL-MCNC: 9 MG/DL — SIGNIFICANT CHANGE UP (ref 7–23)
CALCIUM SERPL-MCNC: 7.3 MG/DL — LOW (ref 8.4–10.5)
CALCIUM SERPL-MCNC: 7.3 MG/DL — LOW (ref 8.4–10.5)
CHLORIDE SERPL-SCNC: 107 MMOL/L — SIGNIFICANT CHANGE UP (ref 96–108)
CHLORIDE SERPL-SCNC: 107 MMOL/L — SIGNIFICANT CHANGE UP (ref 96–108)
CO2 SERPL-SCNC: 24 MMOL/L — SIGNIFICANT CHANGE UP (ref 22–31)
CO2 SERPL-SCNC: 24 MMOL/L — SIGNIFICANT CHANGE UP (ref 22–31)
CREAT SERPL-MCNC: 0.66 MG/DL — SIGNIFICANT CHANGE UP (ref 0.5–1.3)
CREAT SERPL-MCNC: 0.66 MG/DL — SIGNIFICANT CHANGE UP (ref 0.5–1.3)
EGFR: 117 ML/MIN/1.73M2 — SIGNIFICANT CHANGE UP
EGFR: 117 ML/MIN/1.73M2 — SIGNIFICANT CHANGE UP
EOSINOPHIL # BLD AUTO: 0.13 K/UL — SIGNIFICANT CHANGE UP (ref 0–0.5)
EOSINOPHIL # BLD AUTO: 0.13 K/UL — SIGNIFICANT CHANGE UP (ref 0–0.5)
EOSINOPHIL NFR BLD AUTO: 1.5 % — SIGNIFICANT CHANGE UP (ref 0–6)
EOSINOPHIL NFR BLD AUTO: 1.5 % — SIGNIFICANT CHANGE UP (ref 0–6)
FIBRINOGEN PPP-MCNC: 502 MG/DL — HIGH (ref 200–445)
FIBRINOGEN PPP-MCNC: 502 MG/DL — HIGH (ref 200–445)
GLUCOSE SERPL-MCNC: 71 MG/DL — SIGNIFICANT CHANGE UP (ref 70–99)
GLUCOSE SERPL-MCNC: 71 MG/DL — SIGNIFICANT CHANGE UP (ref 70–99)
HCT VFR BLD CALC: 31.1 % — LOW (ref 34.5–45)
HCT VFR BLD CALC: 31.1 % — LOW (ref 34.5–45)
HGB BLD-MCNC: 10.5 G/DL — LOW (ref 11.5–15.5)
HGB BLD-MCNC: 10.5 G/DL — LOW (ref 11.5–15.5)
IMM GRANULOCYTES NFR BLD AUTO: 0.4 % — SIGNIFICANT CHANGE UP (ref 0–0.9)
IMM GRANULOCYTES NFR BLD AUTO: 0.4 % — SIGNIFICANT CHANGE UP (ref 0–0.9)
INR BLD: 0.9 RATIO — SIGNIFICANT CHANGE UP (ref 0.85–1.18)
INR BLD: 0.9 RATIO — SIGNIFICANT CHANGE UP (ref 0.85–1.18)
LDH SERPL L TO P-CCNC: 225 U/L — SIGNIFICANT CHANGE UP (ref 50–242)
LDH SERPL L TO P-CCNC: 225 U/L — SIGNIFICANT CHANGE UP (ref 50–242)
LYMPHOCYTES # BLD AUTO: 2.37 K/UL — SIGNIFICANT CHANGE UP (ref 1–3.3)
LYMPHOCYTES # BLD AUTO: 2.37 K/UL — SIGNIFICANT CHANGE UP (ref 1–3.3)
LYMPHOCYTES # BLD AUTO: 28.1 % — SIGNIFICANT CHANGE UP (ref 13–44)
LYMPHOCYTES # BLD AUTO: 28.1 % — SIGNIFICANT CHANGE UP (ref 13–44)
MCHC RBC-ENTMCNC: 31.2 PG — SIGNIFICANT CHANGE UP (ref 27–34)
MCHC RBC-ENTMCNC: 31.2 PG — SIGNIFICANT CHANGE UP (ref 27–34)
MCHC RBC-ENTMCNC: 33.8 GM/DL — SIGNIFICANT CHANGE UP (ref 32–36)
MCHC RBC-ENTMCNC: 33.8 GM/DL — SIGNIFICANT CHANGE UP (ref 32–36)
MCV RBC AUTO: 92.3 FL — SIGNIFICANT CHANGE UP (ref 80–100)
MCV RBC AUTO: 92.3 FL — SIGNIFICANT CHANGE UP (ref 80–100)
MONOCYTES # BLD AUTO: 0.41 K/UL — SIGNIFICANT CHANGE UP (ref 0–0.9)
MONOCYTES # BLD AUTO: 0.41 K/UL — SIGNIFICANT CHANGE UP (ref 0–0.9)
MONOCYTES NFR BLD AUTO: 4.9 % — SIGNIFICANT CHANGE UP (ref 2–14)
MONOCYTES NFR BLD AUTO: 4.9 % — SIGNIFICANT CHANGE UP (ref 2–14)
NEUTROPHILS # BLD AUTO: 5.45 K/UL — SIGNIFICANT CHANGE UP (ref 1.8–7.4)
NEUTROPHILS # BLD AUTO: 5.45 K/UL — SIGNIFICANT CHANGE UP (ref 1.8–7.4)
NEUTROPHILS NFR BLD AUTO: 64.6 % — SIGNIFICANT CHANGE UP (ref 43–77)
NEUTROPHILS NFR BLD AUTO: 64.6 % — SIGNIFICANT CHANGE UP (ref 43–77)
NRBC # BLD: 0 /100 WBCS — SIGNIFICANT CHANGE UP (ref 0–0)
NRBC # BLD: 0 /100 WBCS — SIGNIFICANT CHANGE UP (ref 0–0)
PLATELET # BLD AUTO: 305 K/UL — SIGNIFICANT CHANGE UP (ref 150–400)
PLATELET # BLD AUTO: 305 K/UL — SIGNIFICANT CHANGE UP (ref 150–400)
POTASSIUM SERPL-MCNC: 3.7 MMOL/L — SIGNIFICANT CHANGE UP (ref 3.5–5.3)
POTASSIUM SERPL-MCNC: 3.7 MMOL/L — SIGNIFICANT CHANGE UP (ref 3.5–5.3)
POTASSIUM SERPL-SCNC: 3.7 MMOL/L — SIGNIFICANT CHANGE UP (ref 3.5–5.3)
POTASSIUM SERPL-SCNC: 3.7 MMOL/L — SIGNIFICANT CHANGE UP (ref 3.5–5.3)
PROT SERPL-MCNC: 6 G/DL — SIGNIFICANT CHANGE UP (ref 6–8.3)
PROT SERPL-MCNC: 6 G/DL — SIGNIFICANT CHANGE UP (ref 6–8.3)
PROTHROM AB SERPL-ACNC: 9.5 SEC — SIGNIFICANT CHANGE UP (ref 9.5–13)
PROTHROM AB SERPL-ACNC: 9.5 SEC — SIGNIFICANT CHANGE UP (ref 9.5–13)
RAPID RVP RESULT: SIGNIFICANT CHANGE UP
RAPID RVP RESULT: SIGNIFICANT CHANGE UP
RBC # BLD: 3.37 M/UL — LOW (ref 3.8–5.2)
RBC # BLD: 3.37 M/UL — LOW (ref 3.8–5.2)
RBC # FLD: 13.1 % — SIGNIFICANT CHANGE UP (ref 10.3–14.5)
RBC # FLD: 13.1 % — SIGNIFICANT CHANGE UP (ref 10.3–14.5)
SARS-COV-2 RNA SPEC QL NAA+PROBE: SIGNIFICANT CHANGE UP
SARS-COV-2 RNA SPEC QL NAA+PROBE: SIGNIFICANT CHANGE UP
SODIUM SERPL-SCNC: 142 MMOL/L — SIGNIFICANT CHANGE UP (ref 135–145)
SODIUM SERPL-SCNC: 142 MMOL/L — SIGNIFICANT CHANGE UP (ref 135–145)
URATE SERPL-MCNC: 5.2 MG/DL — SIGNIFICANT CHANGE UP (ref 2.5–7)
URATE SERPL-MCNC: 5.2 MG/DL — SIGNIFICANT CHANGE UP (ref 2.5–7)
WBC # BLD: 8.43 K/UL — SIGNIFICANT CHANGE UP (ref 3.8–10.5)
WBC # BLD: 8.43 K/UL — SIGNIFICANT CHANGE UP (ref 3.8–10.5)
WBC # FLD AUTO: 8.43 K/UL — SIGNIFICANT CHANGE UP (ref 3.8–10.5)
WBC # FLD AUTO: 8.43 K/UL — SIGNIFICANT CHANGE UP (ref 3.8–10.5)

## 2023-11-13 RX ADMIN — Medication 600 MILLIGRAM(S): at 18:17

## 2023-11-13 RX ADMIN — Medication 975 MILLIGRAM(S): at 16:20

## 2023-11-13 RX ADMIN — SODIUM CHLORIDE 3 MILLILITER(S): 9 INJECTION INTRAMUSCULAR; INTRAVENOUS; SUBCUTANEOUS at 14:44

## 2023-11-13 RX ADMIN — Medication 975 MILLIGRAM(S): at 22:15

## 2023-11-13 RX ADMIN — Medication 25 MILLIGRAM(S): at 23:09

## 2023-11-13 RX ADMIN — Medication 600 MILLIGRAM(S): at 19:15

## 2023-11-13 RX ADMIN — Medication 600 MILLIGRAM(S): at 00:42

## 2023-11-13 RX ADMIN — Medication 975 MILLIGRAM(S): at 15:35

## 2023-11-13 RX ADMIN — NORTRIPTYLINE HYDROCHLORIDE 20 MILLIGRAM(S): 10 CAPSULE ORAL at 23:09

## 2023-11-13 RX ADMIN — Medication 600 MILLIGRAM(S): at 07:36

## 2023-11-13 RX ADMIN — Medication 1 TABLET(S): at 18:17

## 2023-11-13 RX ADMIN — SODIUM CHLORIDE 3 MILLILITER(S): 9 INJECTION INTRAMUSCULAR; INTRAVENOUS; SUBCUTANEOUS at 07:44

## 2023-11-13 RX ADMIN — Medication 600 MILLIGRAM(S): at 14:00

## 2023-11-13 RX ADMIN — Medication 60 MILLIGRAM(S): at 17:45

## 2023-11-13 RX ADMIN — Medication 975 MILLIGRAM(S): at 21:31

## 2023-11-13 RX ADMIN — Medication 975 MILLIGRAM(S): at 09:58

## 2023-11-13 RX ADMIN — Medication 975 MILLIGRAM(S): at 10:45

## 2023-11-13 RX ADMIN — Medication 600 MILLIGRAM(S): at 13:01

## 2023-11-13 RX ADMIN — Medication 600 MILLIGRAM(S): at 06:39

## 2023-11-13 NOTE — PROGRESS NOTE ADULT - ASSESSMENT
A/P: 39yo PPD #4 s/p  c/b sPEC.  Patient is stable and doing well post-partum.     #sPEC  - HA improved   - BPs overnight 120-140s/70-80s  - s/p magnesium  24h  - HELLP wnl   - c/w [id number]    #HA vs migraine  - CT Head () wnl  - consider increasing nortriptyline 10mg to home dosage of 20mg qHS  - reports HA improved    #routine pp care  - Pain well controlled, continue current pain regimen. Standing Ibuprofen, Acetaminophen. Oxycodone available PRN for breakthrough pain.  - Increase ambulation, SCDs when not ambulating  - Continue regular diet    MARC Reyes, PGY3

## 2023-11-14 RX ORDER — LABETALOL HCL 100 MG
200 TABLET ORAL
Refills: 0 | Status: DISCONTINUED | OUTPATIENT
Start: 2023-11-14 | End: 2023-11-15

## 2023-11-14 RX ADMIN — Medication 200 MILLIGRAM(S): at 18:34

## 2023-11-14 RX ADMIN — NORTRIPTYLINE HYDROCHLORIDE 20 MILLIGRAM(S): 10 CAPSULE ORAL at 23:19

## 2023-11-14 RX ADMIN — Medication 60 MILLIGRAM(S): at 17:43

## 2023-11-14 RX ADMIN — Medication 975 MILLIGRAM(S): at 09:44

## 2023-11-14 RX ADMIN — Medication 975 MILLIGRAM(S): at 22:00

## 2023-11-14 RX ADMIN — Medication 25 MILLIGRAM(S): at 23:23

## 2023-11-14 RX ADMIN — Medication 975 MILLIGRAM(S): at 10:30

## 2023-11-14 RX ADMIN — Medication 1 TABLET(S): at 15:00

## 2023-11-14 RX ADMIN — Medication 600 MILLIGRAM(S): at 23:23

## 2023-11-14 RX ADMIN — Medication 975 MILLIGRAM(S): at 16:00

## 2023-11-14 RX ADMIN — Medication 600 MILLIGRAM(S): at 13:45

## 2023-11-14 RX ADMIN — Medication 975 MILLIGRAM(S): at 15:00

## 2023-11-14 RX ADMIN — Medication 600 MILLIGRAM(S): at 06:12

## 2023-11-14 RX ADMIN — Medication 600 MILLIGRAM(S): at 12:59

## 2023-11-14 RX ADMIN — Medication 975 MILLIGRAM(S): at 21:19

## 2023-11-14 RX ADMIN — Medication 600 MILLIGRAM(S): at 18:12

## 2023-11-14 NOTE — PROGRESS NOTE ADULT - ASSESSMENT
A/P: 39yo PPD #5 s/p  c/b sPEC.  Patient is stable and doing well post-partum.     #sPEC  - HA improved   - BPs overnight 110-140s/80s  - s/p magnesium  24h  - HELLP wnl   - c/w [id number]    #HA vs migraine  - CT Head () wnl  - c/w home nortriptyline 20mg qHS  - reports HA improved    #routine pp care  - Pain well controlled, continue current pain regimen. Standing Ibuprofen, Acetaminophen. Oxycodone available PRN for breakthrough pain.  - Increase ambulation, SCDs when not ambulating  - Continue regular diet    MARC Reyes, PGY3

## 2023-11-14 NOTE — PROVIDER CONTACT NOTE (OTHER) - SITUATION
Pt complaining of heart fluttering and racing heart. Pt denies chest pain but states to have one instance of "chest pressure"

## 2023-11-14 NOTE — PROVIDER CONTACT NOTE (OTHER) - ACTION/TREATMENT ORDERED:
MD Reyes notified. MD states pt has already has 2 EKGs. MD also states that she spoke with pt today that her goal BP is 140/90 & her pressures are stable for a preeclamptic. No further orders.

## 2023-11-15 VITALS
TEMPERATURE: 98 F | OXYGEN SATURATION: 99 % | SYSTOLIC BLOOD PRESSURE: 132 MMHG | RESPIRATION RATE: 18 BRPM | DIASTOLIC BLOOD PRESSURE: 83 MMHG | HEART RATE: 90 BPM

## 2023-11-15 RX ORDER — LABETALOL HCL 100 MG
1 TABLET ORAL
Qty: 90 | Refills: 0
Start: 2023-11-15

## 2023-11-15 RX ORDER — NIFEDIPINE 30 MG
1 TABLET, EXTENDED RELEASE 24 HR ORAL
Qty: 30 | Refills: 0
Start: 2023-11-15

## 2023-11-15 RX ORDER — LABETALOL HCL 100 MG
1 TABLET ORAL
Qty: 0 | Refills: 0 | DISCHARGE

## 2023-11-15 RX ORDER — NIFEDIPINE 30 MG
1 TABLET, EXTENDED RELEASE 24 HR ORAL
Qty: 0 | Refills: 0 | DISCHARGE

## 2023-11-15 RX ADMIN — Medication 200 MILLIGRAM(S): at 05:47

## 2023-11-15 RX ADMIN — Medication 600 MILLIGRAM(S): at 00:00

## 2023-11-15 RX ADMIN — Medication 600 MILLIGRAM(S): at 06:17

## 2023-11-15 RX ADMIN — Medication 600 MILLIGRAM(S): at 05:47

## 2023-11-15 RX ADMIN — Medication 1 TABLET(S): at 12:01

## 2023-11-15 RX ADMIN — Medication 975 MILLIGRAM(S): at 09:22

## 2023-11-15 RX ADMIN — Medication 600 MILLIGRAM(S): at 12:01

## 2023-11-15 NOTE — PROGRESS NOTE ADULT - SUBJECTIVE AND OBJECTIVE BOX
Postpartum Note-  Section POD#1    Allergies    Macrobid (Rash)    Intolerances      Blood Type B Positive     RPR Negative    Rubella: Immune    S: Patient is a  39yo        POD#1 S/P  primary C/Sec  Patient w/o complaints, pain is controlled.  Pt is OOB, tolerating PO, + flatus. Lochia WNL. + void    Feeding: Breast    O:  Vital Signs Last 24 Hrs  T(C): 36.5 (10 Nov 2023 05:50), Max: 38.0 (2023 22:15)  T(F): 97.7 (10 Nov 2023 05:50), Max: 100.4 (2023 22:15)  HR: 88 (10 Nov 2023 05:50) (82 - 169)  BP: 135/80 (10 Nov 2023 05:50) (117/66 - 163/84)  RR: 18 (10 Nov 2023 05:50) (17 - 18)  SpO2: 96% (10 Nov 2023 05:50) (88% - 100%)      I&O's Summary    2023 07:01  -  10 Nov 2023 07:00  --------------------------------------------------------  IN: 2416.3 mL / OUT: 2595 mL / NET: -178.7 mL        Gen: NAD  Abdomen: +BS, Soft, nontender, non-distended, fundus firm.  Incision: Clean, dry, and intact.  Negative erythema/edema/ecchymosis   Staples  Lochia WNL  Ext: SCDs in place. Negative Homans B/L    LABS:                          11.7   8.40  )-----------( 265      ( 2023 05:16 )             33.8                     
OB Progress Note:  PPD#2    S: 39yo  PPD#2 s/p . Patient feels well. Pain is well controlled. She is tolerating a regular diet, voiding spontaneously, and ambulating without difficulty. Denies severe HA, blurry vision, RUQ pain, SOB. Concerned about her BPs of 140s/80s.    Vitals:   Vital Signs Last 24 Hrs  T(C): 36.6 (2023 05:12), Max: 36.9 (2023 09:15)  T(F): 97.9 (2023 05:12), Max: 98.4 (2023 09:15)  HR: 95 (2023 05:12) (94 - 98)  BP: 118/83 (2023 05:12) (118/83 - 144/89)  BP(mean): --  RR: 18 (2023 05:12) (18 - 20)  SpO2: 99% (2023 05:12) (94% - 100%)    Parameters below as of 2023 05:12  Patient On (Oxygen Delivery Method): room air        MEDICATIONS  (STANDING):  acetaminophen     Tablet .. 975 milliGRAM(s) Oral <User Schedule>  diphtheria/tetanus/pertussis (acellular) Vaccine (Adacel) 0.5 milliLiter(s) IntraMuscular once  ibuprofen  Tablet. 600 milliGRAM(s) Oral every 6 hours  lactated ringers. 1000 milliLiter(s) (50 mL/Hr) IV Continuous <Continuous>  magnesium sulfate Infusion 1 Gm/Hr (25 mL/Hr) IV Continuous <Continuous>  NIFEdipine XL 60 milliGRAM(s) Oral daily  nortriptyline 20 milliGRAM(s) Oral at bedtime  oxytocin Infusion 41.667 milliUNIT(s)/Min (125 mL/Hr) IV Continuous <Continuous>  prenatal multivitamin 1 Tablet(s) Oral daily  sodium chloride 0.9% lock flush 3 milliLiter(s) IV Push every 8 hours    MEDICATIONS  (PRN):  benzocaine 20%/menthol 0.5% Spray 1 Spray(s) Topical every 6 hours PRN for Perineal discomfort  calcium carbonate    500 mG (Tums) Chewable 1 Tablet(s) Chew every 6 hours PRN Heartburn  dibucaine 1% Ointment 1 Application(s) Topical every 6 hours PRN Perineal discomfort  diphenhydrAMINE 25 milliGRAM(s) Oral every 6 hours PRN Pruritus  hydrocortisone 1% Cream 1 Application(s) Topical every 6 hours PRN Moderate Pain (4-6)  lanolin Ointment 1 Application(s) Topical every 6 hours PRN nipple soreness  magnesium hydroxide Suspension 30 milliLiter(s) Oral two times a day PRN Constipation  oxyCODONE    IR 5 milliGRAM(s) Oral every 3 hours PRN Moderate to Severe Pain (4-10)  oxyCODONE    IR 5 milliGRAM(s) Oral once PRN Moderate to Severe Pain (4-10)  pramoxine 1%/zinc 5% Cream 1 Application(s) Topical every 4 hours PRN Moderate Pain (4-6)  simethicone 80 milliGRAM(s) Chew every 4 hours PRN Gas  witch hazel Pads 1 Application(s) Topical every 4 hours PRN Perineal discomfort      Labs:  Blood type: B Positive  Rubella IgG: RPR: Negative                          10.5<L>   8.43 >-----------< 305    (  @ 06:14 )             31.1<L>                        11.2<L>   9.94 >-----------< 310    (  @ 12:26 )             32.6<L>                        10.1<L>   11.63<H> >-----------< 245    (  @ 17:34 )             29.1<L>    23 @ 06:13      142  |  107  |  9   ----------------------------<  71  3.7   |  24  |  0.66    23 @ 12:25      139  |  102  |  7   ----------------------------<  135<H>  3.8   |  24  |  0.61    23 @ 17:34      139  |  105  |  10  ----------------------------<  72  4.0   |  24  |  0.66        Ca    7.3<L>      2023 06:13  Ca    7.3<L>      2023 12:25  Ca    8.8      2023 17:34  Mg     6.2<H>       Mg     6.2<H>       Mg     5.2<H>         TPro  6.0  /  Alb  3.1<L>  /  TBili  0.1<L>  /  DBili  x   /  AST  47<H>  /  ALT  30  /  AlkPhos  115  23 @ 06:13  TPro  6.6  /  Alb  3.5  /  TBili  0.1<L>  /  DBili  x   /  AST  32  /  ALT  18  /  AlkPhos  135<H>  23 @ 12:25  TPro  6.1  /  Alb  3.3  /  TBili  0.1<L>  /  DBili  x   /  AST  28  /  ALT  13  /  AlkPhos  125<H>  23 @ 17:34          Physical Exam:  General: NAD  Abdomen: soft, non-tender, non-distended, fundus firm  Vaginal: lochia wnl, exam deferred  Extremities: No erythema/calf tenderness  
OB Progress Note:  PPD #3    S: Patient feels well, headache slightly better . Pain is well controlled. She is tolerating a regular diet and passing flatus. She is voiding spontaneously, and ambulating without difficulty. Denies CP/SOB. Denies lightheadedness/dizziness. Denies N/V.    O:  Vitals:  Vital Signs Last 24 Hrs  T(C): 37 (2023 03:00), Max: 37 (2023 03:00)  T(F): 98.6 (2023 03:00), Max: 98.6 (2023 03:00)  HR: 100 (2023 03:00) (79 - 110)  BP: 142/81 (2023 03:00) (117/67 - 151/88)  BP(mean): 107 (2023 03:00) (86 - 114)  RR: 18 (2023 23:30) (17 - 18)  SpO2: 99% (2023 03:00) (94% - 100%)    Parameters below as of 2023 21:30  Patient On (Oxygen Delivery Method): room air        MEDICATIONS  (STANDING):  acetaminophen     Tablet .. 975 milliGRAM(s) Oral <User Schedule>  diphtheria/tetanus/pertussis (acellular) Vaccine (Adacel) 0.5 milliLiter(s) IntraMuscular once  ibuprofen  Tablet. 600 milliGRAM(s) Oral every 6 hours  lactated ringers. 1000 milliLiter(s) (50 mL/Hr) IV Continuous <Continuous>  NIFEdipine XL 60 milliGRAM(s) Oral daily  nortriptyline 10 milliGRAM(s) Oral daily  oxytocin Infusion 41.667 milliUNIT(s)/Min (125 mL/Hr) IV Continuous <Continuous>  polyethylene glycol 3350 17 Gram(s) Oral daily  prenatal multivitamin 1 Tablet(s) Oral daily  sodium chloride 0.9% lock flush 3 milliLiter(s) IV Push every 8 hours  valACYclovir 500 milliGRAM(s) Oral daily      Labs:  Blood type: B Positive  Rubella IgG: RPR: Negative                          10.1<L>   11.63<H> >-----------< 245    (  @ 17:34 )             29.1<L>                        11.7   8.40 >-----------< 265    (  @ 05:16 )             33.8<L>    23 @ 17:34      139  |  105  |  10  ----------------------------<  72  4.0   |  24  |  0.66    23 @ 05:16      137  |  102  |  11  ----------------------------<  79  3.8   |  19<L>  |  0.64        Ca    8.8      2023 17:34  Ca    10.0      2023 05:16    TPro  6.1  /  Alb  3.3  /  TBili  0.1<L>  /  DBili  x   /  AST  28  /  ALT  13  /  AlkPhos  125<H>  23 @ 17:34  TPro  7.0  /  Alb  3.7  /  TBili  0.2  /  DBili  x   /  AST  19  /  ALT  11  /  AlkPhos  178<H>  23 @ 05:16          Physical Exam:  General: NAD  Abdomen: Soft, non-tender, non-distended, fundus firm  Vaginal: Lochia wnl  Extremities: No erythema/edema  
OB Progress Note:  PPD#4    S: 37yo  PPD#4 s/p . Patient feels well. Pain is well controlled. Overnight complained of chest pain which felt like a "moving" sensation underneath her sternum which occured when she ate. Resolved with pepcid. She is tolerating a regular diet, voiding spontaneously, ambulating without difficulty. Denies HA, blurry vision, RUQ pain, SOB.     O:  Vitals:   Vital Signs Last 24 Hrs  T(C): 36.7 (2023 00:46), Max: 36.7 (2023 11:45)  T(F): 98.1 (2023 00:46), Max: 98.1 (2023 21:30)  HR: 97 (2023 00:46) (89 - 109)  BP: 121/75 (2023 00:46) (117/74 - 143/81)  BP(mean): --  RR: 18 (2023 00:46) (18 - 18)  SpO2: 97% (2023 00:46) (95% - 99%)    Parameters below as of 2023 00:46  Patient On (Oxygen Delivery Method): room air        MEDICATIONS  (STANDING):  acetaminophen     Tablet .. 975 milliGRAM(s) Oral <User Schedule>  diphtheria/tetanus/pertussis (acellular) Vaccine (Adacel) 0.5 milliLiter(s) IntraMuscular once  ibuprofen  Tablet. 600 milliGRAM(s) Oral every 6 hours  lactated ringers. 1000 milliLiter(s) (50 mL/Hr) IV Continuous <Continuous>  magnesium sulfate Infusion 1 Gm/Hr (25 mL/Hr) IV Continuous <Continuous>  NIFEdipine XL 60 milliGRAM(s) Oral daily  nortriptyline 20 milliGRAM(s) Oral at bedtime  oxytocin Infusion 41.667 milliUNIT(s)/Min (125 mL/Hr) IV Continuous <Continuous>  prenatal multivitamin 1 Tablet(s) Oral daily  sodium chloride 0.9% lock flush 3 milliLiter(s) IV Push every 8 hours    MEDICATIONS  (PRN):  benzocaine 20%/menthol 0.5% Spray 1 Spray(s) Topical every 6 hours PRN for Perineal discomfort  calcium carbonate    500 mG (Tums) Chewable 1 Tablet(s) Chew every 6 hours PRN Heartburn  dibucaine 1% Ointment 1 Application(s) Topical every 6 hours PRN Perineal discomfort  diphenhydrAMINE 25 milliGRAM(s) Oral every 6 hours PRN Pruritus  hydrocortisone 1% Cream 1 Application(s) Topical every 6 hours PRN Moderate Pain (4-6)  lanolin Ointment 1 Application(s) Topical every 6 hours PRN nipple soreness  magnesium hydroxide Suspension 30 milliLiter(s) Oral two times a day PRN Constipation  oxyCODONE    IR 5 milliGRAM(s) Oral every 3 hours PRN Moderate to Severe Pain (4-10)  oxyCODONE    IR 5 milliGRAM(s) Oral once PRN Moderate to Severe Pain (4-10)  pramoxine 1%/zinc 5% Cream 1 Application(s) Topical every 4 hours PRN Moderate Pain (4-6)  simethicone 80 milliGRAM(s) Chew every 4 hours PRN Gas  witch hazel Pads 1 Application(s) Topical every 4 hours PRN Perineal discomfort      Labs:  Blood type: B Positive  Rubella IgG: RPR: Negative                          11.2<L>   9.94 >-----------< 310    (  @ 12:26 )             32.6<L>                        10.1<L>   11.63<H> >-----------< 245    (  @ 17:34 )             29.1<L>    23 @ 12:25      139  |  102  |  7   ----------------------------<  135<H>  3.8   |  24  |  0.61    23 @ 17:34      139  |  105  |  10  ----------------------------<  72  4.0   |  24  |  0.66        Ca    7.3<L>      2023 12:25  Ca    8.8      2023 17:34  Mg     6.2<H>     11-12  Mg     6.2<H>     11-12  Mg     5.2<H>     -12    TPro  6.6  /  Alb  3.5  /  TBili  0.1<L>  /  DBili  x   /  AST  32  /  ALT  18  /  AlkPhos  135<H>  23 @ 12:25  TPro  6.1  /  Alb  3.3  /  TBili  0.1<L>  /  DBili  x   /  AST  28  /  ALT  13  /  AlkPhos  125<H>  23 @ 17:34          Physical Exam:  General: NAD  Abdomen: soft, non-tender, non-distended, fundus firm  Vaginal: lochia wnl, exam deferred  Extremities: No calf tenderness  
OB Progress Note:  PPD#6    S: Patient feels well overall. Again reports chest pressure in the evenings. EKG was normal and sensation resolved without further intervention. Now denies severe HA, blurry vision, RUQ pain, SOB.     O:  Vitals:   Vital Signs Last 24 Hrs  T(C): 36.8 (15 Nov 2023 01:14), Max: 36.8 (15 Nov 2023 01:14)  T(F): 98.2 (15 Nov 2023 01:), Max: 98.2 (15 Nov 2023 01:14)  HR: 96 (15 Nov 2023 01:) (96 - 100)  BP: 147/91 (15 Nov 2023 01:) (131/91 - 147/91)  BP(mean): --  RR: 18 (15 Nov 2023 01:) (18 - 18)  SpO2: 99% (15 Nov 2023 01:) (97% - 99%)    Parameters below as of 15 Nov 2023 01:14  Patient On (Oxygen Delivery Method): room air        MEDICATIONS  (STANDING):  acetaminophen     Tablet .. 975 milliGRAM(s) Oral <User Schedule>  diphtheria/tetanus/pertussis (acellular) Vaccine (Adacel) 0.5 milliLiter(s) IntraMuscular once  ibuprofen  Tablet. 600 milliGRAM(s) Oral every 6 hours  labetalol 200 milliGRAM(s) Oral two times a day  lactated ringers. 1000 milliLiter(s) (50 mL/Hr) IV Continuous <Continuous>  magnesium sulfate Infusion 1 Gm/Hr (25 mL/Hr) IV Continuous <Continuous>  NIFEdipine XL 60 milliGRAM(s) Oral daily  nortriptyline 20 milliGRAM(s) Oral at bedtime  oxytocin Infusion 41.667 milliUNIT(s)/Min (125 mL/Hr) IV Continuous <Continuous>  prenatal multivitamin 1 Tablet(s) Oral daily  sodium chloride 0.9% lock flush 3 milliLiter(s) IV Push every 8 hours    MEDICATIONS  (PRN):  benzocaine 20%/menthol 0.5% Spray 1 Spray(s) Topical every 6 hours PRN for Perineal discomfort  calcium carbonate    500 mG (Tums) Chewable 1 Tablet(s) Chew every 6 hours PRN Heartburn  dibucaine 1% Ointment 1 Application(s) Topical every 6 hours PRN Perineal discomfort  diphenhydrAMINE 25 milliGRAM(s) Oral every 6 hours PRN Pruritus  hydrocortisone 1% Cream 1 Application(s) Topical every 6 hours PRN Moderate Pain (4-6)  lanolin Ointment 1 Application(s) Topical every 6 hours PRN nipple soreness  magnesium hydroxide Suspension 30 milliLiter(s) Oral two times a day PRN Constipation  oxyCODONE    IR 5 milliGRAM(s) Oral every 3 hours PRN Moderate to Severe Pain (4-10)  oxyCODONE    IR 5 milliGRAM(s) Oral once PRN Moderate to Severe Pain (4-10)  pramoxine 1%/zinc 5% Cream 1 Application(s) Topical every 4 hours PRN Moderate Pain (4-6)  simethicone 80 milliGRAM(s) Chew every 4 hours PRN Gas  witch hazel Pads 1 Application(s) Topical every 4 hours PRN Perineal discomfort      Labs:  Blood type: B Positive  Rubella IgG: RPR: Negative                          10.5<L>   8.43 >-----------< 305    (  @ 06:14 )             31.1<L>                        11.2<L>   9.94 >-----------< 310    (  @ 12:26 )             32.6<L>    23 @ 06:13      142  |  107  |  9   ----------------------------<  71  3.7   |  24  |  0.66    23 @ 12:25      139  |  102  |  7   ----------------------------<  135<H>  3.8   |  24  |  0.61        Ca    7.3<L>      2023 06:13  Ca    7.3<L>      2023 12:25  Mg     6.2<H>     11-12  Mg     6.2<H>     11-12  Mg     5.2<H>     11-12    TPro  6.0  /  Alb  3.1<L>  /  TBili  0.1<L>  /  DBili  x   /  AST  47<H>  /  ALT  30  /  AlkPhos  115  23 @ 06:13  TPro  6.6  /  Alb  3.5  /  TBili  0.1<L>  /  DBili  x   /  AST  32  /  ALT  18  /  AlkPhos  135<H>  23 @ 12:25          Physical Exam:  General: NAD  Abdomen: soft, non-tender, non-distended, fundus firm  Vaginal: lochia wnl, exam deferred  Extremities: No erythema/calf tenderness    
Postpartum Note- PPD# 2    Allergies: Macrobid (Rash)    Blood type: B positive  Rubella: Immune  RPR: Negative       S: Patient is a 38y  PPD#2 s/p .   Patient w/o complaints, pain is controlled.    Pt is OOB, tolerating PO, voiding and passing flatus. Lochia WNL.     O:  Vital Signs Last 24 Hrs  T(C): 36.5 (2023 05:47), Max: 36.9 (10 Nov 2023 08:52)  T(F): 97.7 (2023 05:47), Max: 98.4 (10 Nov 2023 08:52)  HR: 103 (2023 05:47) (95 - 106)  BP: 127/84 (2023 05:47) (117/75 - 143/86)  BP(mean): --  RR: 18 (2023 05:47) (18 - 18)  SpO2: 96% (2023 05:47) (96% - 99%)    Parameters below as of 2023 05:47  Patient On (Oxygen Delivery Method): room air     Gen: NAD  Abdomen: Soft, nontender, non-distended, fundus firm.  Lochia WNL  Ext: Neg edema, Neg calf tenderness      A/P:  38y PPD#2 s/p , doing well.      PMHx: Migraines  Current Issues: gHTN-pt asx, BP's WNL-mild range, no meds, will continue to monitor     Increase OOB  Regular diet  Continue to monitor BP's  PO Pain protocol  Continue with routine post partum care    Jen Cortez PA-C        
Postpartum Note- PPD#1    Allergies: Macrobid (Rash)    Blood type: B positive  Rubella: Immune  RPR: Negative     S: Patient is a 38y  PPD#1 s/p .   Patient w/o complaints, pain is controlled.    Pt is OOB, tolerating PO, voiding and passing flatus. Lochia WNL.     O:  Vital Signs Last 24 Hrs  T(C): 36.5 (10 Nov 2023 05:50), Max: 38.0 (2023 22:15)  T(F): 97.7 (10 Nov 2023 05:50), Max: 100.4 (2023 22:15)  HR: 88 (10 Nov 2023 05:50) (82 - 169)  BP: 135/80 (10 Nov 2023 05:50) (117/66 - 163/84)  BP(mean): --  RR: 18 (10 Nov 2023 05:50) (17 - 18)  SpO2: 96% (10 Nov 2023 05:50) (88% - 100%)    Parameters below as of 10 Nov 2023 05:50  Patient On (Oxygen Delivery Method): room air      Gen: NAD  Abdomen: Soft, nontender, non-distended, fundus firm.  Lochia WNL  Ext: Neg edema, Neg calf tenderness    LABS:  Hemoglobin: 11.7 g/dL (23 @ 05:16)  Hematocrit: 33.8 % (23 @ 05:16)      A/P:  38y PPD#1 s/p , doing well.     PMHx: Migraines  Current Issues: gHTN-pt asx, BP's WNL-mild range, will continue to monitor     Increase OOB  Regular diet  Continue to monitor BP's  PO Pain protocol  Continue routine post partum care    Jen Cortez PA-C

## 2023-11-15 NOTE — PROVIDER CONTACT NOTE (OTHER) - REASON
blood pressure
Pt complaining of heart fluttering
Pt concerned about labile blood pressures
pt c/o chest pressure
Pt complains of chest pressure and congestion

## 2023-11-15 NOTE — PROVIDER CONTACT NOTE (OTHER) - ASSESSMENT
Pt denies headaches, blurry vision, right epigastric pain. Pt asks if medication regimen will be changed.
Pt AXOX4, denies chest pain, lightheadedness, dizziness, headaches, SOB, and palpitations. No complaints at this time.
Lungs clear and bilateral patellar reflex +2. Pt denies shortness of breath or trouble breathing. VS stable. Mg being infused at 1g.
Pt has heart fluttering and says it might be her anxiety but she is not sure. HR 97. Pt expresses concern about BPs as well, last /95. Pt denies headaches, blurry vision or epigastric pain.
Pt states she feels a constant pressure on chest. Denies chest pain that radiates or gets worse on inspiration.

## 2023-11-15 NOTE — PROVIDER CONTACT NOTE (OTHER) - BACKGROUND
s/p mg. Procardia 60 and Labetalol 200 BID.
, Mg 38.1.
s/p vaginal delivery
 s/p mg Day 6
, S/P Mg 38.1. Hx of ghtn. On procardia 60 daily

## 2023-11-15 NOTE — PROGRESS NOTE ADULT - ASSESSMENT
A/P: 39yo PPD #6 s/p  c/b sPEC.  Patient is stable and doing well post-partum.     #sPEC  - HA improved   - BPs overnight 130-140/90s  - s/p magnesium  24h  - HELLP wnl   - c/w [id number], labetalol 200 BID    #HA vs migraine  - CT Head () wnl  - c/w home nortriptyline 20mg qHS  - reports HA improved    #routine pp care  - Pain well controlled, continue current pain regimen. Standing Ibuprofen, Acetaminophen. Oxycodone available PRN for breakthrough pain.  - Increase ambulation, SCDs when not ambulating  - Continue regular diet    MARC Reyes, PGY3

## 2023-11-15 NOTE — PROGRESS NOTE ADULT - ATTENDING COMMENTS
I have personally seen, examined, and participated in the care of this patient. I have reviewed all pertinent clinical information, including history, physical exam, plan, and the Resident 's note and agree except as noted.                abd- soft, nontender               uterus- nontender, firm , below umbilicus               lochia- mild.               ext- no cords.  a/p  S/P          Bps better now on Procardia  60 - Track bp now that Mag is d/jones. Labs normal. Nondiagnostic rise minor elevation of lft.         Increase OOB         Regular diet         PO Pain protocol         Routine Postpartum Care
Ob Attg Note  Pt is now PPD5 after vaginal delivery.  her postpartum course is complicated with elevated blood pressures, suspicion of preeclampsia.  s/p MgSO4.  Pt has been started on Procardia and it was up'd from 30 to 60mg qd, but her BP's are still not optimally controlled.  discussed mgmt of elevated BP's, pp preeclampsia. clinical implications discussed.  advised pt to start Labetalol 200mg po bid.  If her BP's are better by tomorrow, she can go home then.
I have personally seen, examined, and participated in the care of this patient. I have reviewed all pertinent clinical information, including history, physical exam, plan, and the Resident 's note and agree except as noted.                abd- soft, nontender               uterus- nontender, firm , below umbilicus               lochia- mild.               ext- no cords.  a/p  S/P  sPEC,  bp's below 150/100 on meds.           D/c planning.
I have personally seen, examined, and participated in the care of this patient. I have reviewed all pertinent clinical information, including history, physical exam, plan, and the Resident 's note and agree except as noted.                abd- soft, nontender               uterus- nontender, firm , below umbilicus               lochia- mild.               ext- no cords.  a/p  S/P - SPEC               on mag and procardia 30. Monitor bps. May need to increase meds.  Ct pending. Headache improved- possible only a migraine with aura.
I have personally seen, examined, and participated in the care of this patient. I have reviewed all pertinent clinical information, including history, physical exam, plan, and the Resident 's note and agree except as noted.                abd- soft, nontender               uterus- nontender, firm , below umbilicus               lochia- mild.               ext- no cords.  a/p  S/P  day 2.         No severe bp. Normalizing. No pec symptomsl.          D/c planning.  bps at home 3 x a day.          Call for bps 140/90 or greater, nausea, vomiting, headache that tylenol is not effective,blurry vision or upper abdominal pain.           Telemed in 2 weeks.

## 2023-11-16 ENCOUNTER — APPOINTMENT (OUTPATIENT)
Dept: ANTEPARTUM | Facility: CLINIC | Age: 38
End: 2023-11-16

## 2023-11-20 ENCOUNTER — APPOINTMENT (OUTPATIENT)
Age: 38
End: 2023-11-20

## 2023-12-01 ENCOUNTER — TRANSCRIPTION ENCOUNTER (OUTPATIENT)
Age: 38
End: 2023-12-01

## 2023-12-04 ENCOUNTER — TRANSCRIPTION ENCOUNTER (OUTPATIENT)
Age: 38
End: 2023-12-04

## 2023-12-05 ENCOUNTER — NON-APPOINTMENT (OUTPATIENT)
Age: 38
End: 2023-12-05

## 2023-12-05 ENCOUNTER — APPOINTMENT (OUTPATIENT)
Dept: PAIN MANAGEMENT | Facility: CLINIC | Age: 38
End: 2023-12-05
Payer: COMMERCIAL

## 2023-12-05 VITALS
HEIGHT: 62 IN | HEART RATE: 111 BPM | SYSTOLIC BLOOD PRESSURE: 133 MMHG | WEIGHT: 178 LBS | BODY MASS INDEX: 32.76 KG/M2 | DIASTOLIC BLOOD PRESSURE: 89 MMHG

## 2023-12-05 PROCEDURE — 64405 NJX AA&/STRD GR OCPL NRV: CPT | Mod: 50

## 2023-12-05 PROCEDURE — 99214 OFFICE O/P EST MOD 30 MIN: CPT | Mod: 25

## 2023-12-13 ENCOUNTER — APPOINTMENT (OUTPATIENT)
Age: 38
End: 2023-12-13
Payer: COMMERCIAL

## 2023-12-13 ENCOUNTER — APPOINTMENT (OUTPATIENT)
Age: 38
End: 2023-12-13

## 2023-12-13 PROBLEM — Z00.00 ENCOUNTER FOR PREVENTIVE HEALTH EXAMINATION: Status: ACTIVE | Noted: 2023-12-13

## 2023-12-13 PROCEDURE — S9443: CPT | Mod: 95

## 2023-12-22 ENCOUNTER — TRANSCRIPTION ENCOUNTER (OUTPATIENT)
Age: 38
End: 2023-12-22

## 2023-12-22 ENCOUNTER — APPOINTMENT (OUTPATIENT)
Age: 38
End: 2023-12-22
Payer: COMMERCIAL

## 2023-12-22 PROCEDURE — S9443: CPT | Mod: 95

## 2024-01-26 ENCOUNTER — APPOINTMENT (OUTPATIENT)
Dept: PAIN MANAGEMENT | Facility: CLINIC | Age: 39
End: 2024-01-26
Payer: COMMERCIAL

## 2024-01-26 VITALS
SYSTOLIC BLOOD PRESSURE: 115 MMHG | WEIGHT: 178 LBS | HEIGHT: 62 IN | DIASTOLIC BLOOD PRESSURE: 81 MMHG | BODY MASS INDEX: 32.76 KG/M2 | HEART RATE: 103 BPM

## 2024-01-26 PROCEDURE — 64615 CHEMODENERV MUSC MIGRAINE: CPT

## 2024-01-26 PROCEDURE — 99213 OFFICE O/P EST LOW 20 MIN: CPT | Mod: 25

## 2024-01-26 NOTE — ASSESSMENT
[FreeTextEntry1] : 37 y/o F  Pmhx chronic migraines new daily persistent headaches since April 2021, currently breast feeding.  She is responding well to Botox treatment as well as ONB/trigger points.  Extensively discussed risks and benefits of migraine/headache treatment options in pregnancy including nonpharmacologic, OTC , prescription medications use as well as interventions such as occipital nerve blocks, trigger points and Botox treatment. We discussed that there is limited data on Botox exposure/use in pregnancy and patient understands risks and benefits and would like to continue Botox injections at this time.  - Botox tx performed today without AE  - nortriptyline  - continue Botox -Consider APAP, caffeine as abortive , metoclopramide, Benadryl discussed with OB.

## 2024-01-26 NOTE — PROCEDURE
[Chronic migraine] : Chronic migraine [Consent Signed] : consent signed [Adverse Effects] : no adverse effects [Continue Current Treatment] : continue current treatment [BOTOX 200 Units] : BOTOX 200 units; 5 units per muscle site.  procerus x 1, corragator x 2, frontalis x 4, temporalis x 8, occipitalis x 6, cervical  paraspinal x 4 and trapezius x 6 [FreeTextEntry1] : 10 units masseter 5 units B/l crow's feet   30units wasted   lot#[id number]   EXP:06/2026

## 2024-01-26 NOTE — HISTORY OF PRESENT ILLNESS
[FreeTextEntry1] : 38 year RH female Pmhx HLD, Anxiety, chronic Migraines now 36 weeks pregnant who presents today for follow up.  Reports has been doing very well in regards to headaches but with increased headaches the last 2 weeks. She continues to respond well to Botox tx with significant reduction in intensity and frequency of headaches.    Failed Amitriptyline D/c ( HTN, chest pain, palpitations, weight gain), failed propranolol, Topamax (AE- brain fog), Qulipta, Ajovy - failed AE rash, Vyepti- AE severe congestion , Nurtec QOD, Cymbalta 30 mg daily, Current meds include: Nortriptyline 40 mg q hs, Botox April 25th ( 1 month past due), ONB and Trigger points w/ Dr. Carlos q 6 weeks.  Diclegis ( Nausea and sleep), Cefaly, Ice, APAP prn   Family hx of Migraines in half brother.  She is single and lives in [place]. 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 She does not smoke or vape, denies etoh. Denies illicit drug use. She was exercise regularly but stopped last year.

## 2024-01-26 NOTE — REASON FOR VISIT
There are no preventive care reminders to display for this patient.    Immunizations - reviewed and updated   Care Everywhere - triggered   Care Teams - updated   Outreach - PETER sent to Dr. Jian Hughes for most recent pap records. What I received on 8/21/23 was office notes from 2019.  Patient see Dr. Hughes on 8/17/23 for pap.         
[Follow-Up: _____] : a [unfilled] follow-up visit

## 2024-02-05 ENCOUNTER — NON-APPOINTMENT (OUTPATIENT)
Age: 39
End: 2024-02-05

## 2024-02-06 ENCOUNTER — APPOINTMENT (OUTPATIENT)
Dept: PAIN MANAGEMENT | Facility: CLINIC | Age: 39
End: 2024-02-06
Payer: COMMERCIAL

## 2024-02-06 VITALS
BODY MASS INDEX: 32.76 KG/M2 | HEIGHT: 62 IN | WEIGHT: 178 LBS | SYSTOLIC BLOOD PRESSURE: 125 MMHG | DIASTOLIC BLOOD PRESSURE: 92 MMHG | HEART RATE: 93 BPM

## 2024-02-06 PROCEDURE — 20553 NJX 1/MLT TRIGGER POINTS 3/>: CPT

## 2024-02-06 PROCEDURE — 99214 OFFICE O/P EST MOD 30 MIN: CPT | Mod: 25

## 2024-02-06 PROCEDURE — 64405 NJX AA&/STRD GR OCPL NRV: CPT | Mod: 59

## 2024-02-06 NOTE — PHYSICAL EXAM
Strep Throat  Strep throat is a throat infection caused by a bacteria called group A Streptococcus bacteria (group A strep). The bacteria live in the nose and throat. Strep throat is contagious and spreads easily from person to person through airborne droplets when an infected person coughs, sneezes, or talks. Good hand washing is important to help prevent the spread of this illness.  Children diagnosed with strep throat should not attend school or  until they have been taking antibiotics and had no fever for 24 hours.  Strep throat mainly affects school-aged children between 5 and 15 years of age, but can affect adults too. When it isn't treated, it can lead to serious problems including rheumatic fever (an inflammation of the joints and heart) and kidney damage.    How is Strep Throat Spread?  Strep throat can be easily spread from an infected person's saliva by:  · Drinking and eating after them  · Sharing a straw, cup, toothbrushes, and eating utensils    THERAPIES:  -Complete antibiotics as prescribed  -Take probiotic while on antibiotics  -Separate probiotic from antibiotic by a minimum of 2 hours  -Salt water gargles 4 times a day (1/2 tsp salt in 8 oz of warm water)  -Honey throat lozenges  -Stay well hydrated, push fluids  -Discard tooth brush on day 3 of antibiotics and start new  -Tylenol or ibuprofen as needed and directed (avoid ibuprofen if you have a history of high blood pressure or GI bleeding)    When To Go to the Emergency Room (ER)  Call 911 if you have trouble breathing or swallowing. Call your health care provider about other symptoms of strep throat, such as:  · Any inability to swallow, voice changes  · Temperature of 104°F (40.0°C) or higher in a child, repeated temperature of 102 F or higher in an adult  · A fever that lasts more than 24 hours in a child under 2 years old or for 3 days in a child 2 years and older  · Seizure caused by fever  · Pus in the back of the  throat  ·   Patient Education     Allergic Rhinitis  Allergic rhinitis is an allergic reaction that affects the nose, and often the eyes. It’s often known as nasal allergies. Nasal allergies are often due to things in the environment that are breathed in. Depending what you are sensitive to, nasal allergies may occur only during certain seasons, or they may occur year round. Common indoor allergens include house dust mites, mold, cockroaches, and pet dander. Outdoor allergens include pollen from trees, grasses, and weeds.    Symptoms include a drippy, stuffy, and itchy nose. They also include sneezing and red and itchy eyes. You may feel tired more often. Severe allergies may also affect your breathing and trigger a condition called asthma.    Tests can be done to see what allergens are affecting you. You may be referred to an allergy specialist for testing and further evaluation.   Home care  Your healthcare provider may prescribe medicines to help relieve allergy symptoms. These may include oral medicines, nasal sprays, or eye drops.   Ask your provider for advice on how to stay away from substances that you are allergic to. Below are a few tips for each type of allergen.   Pet dander:  Do not have pets with fur and feathers.  If you have a pet, keep it out of your bedroom and off upholstered furniture.  Pollen:  When pollen counts are high, keep windows of your car and home closed. If possible, use an air conditioner instead.  Wear a filter mask when mowing or doing yard work.  House dust mites:  Wash bedding every week in warm water and detergent and dry on a hot setting.  Cover the mattress, box spring, and pillows with allergy covers.   If possible, sleep in a room with no carpet, curtains, or upholstered furniture.  Cockroaches:  Store food in sealed containers.  Remove garbage from the home promptly.  Fix water leaks.  Mold:  Keep humidity low by using a dehumidifier or air conditioner. Keep the dehumidifier  and air conditioner clean and free of mold.  Clean moldy areas with bleach and water. Don't mix bleach with other .  In general:  Vacuum once or twice a week. If possible, use a vacuum with a high-efficiency particulate air (HEPA) filter.  Don't smoke. Stay away from cigarette smoke. Cigarette smoke is an irritant that can make symptoms worse.  Follow-up care  Follow up as advised by the healthcare provider or our staff. If you were referred to an allergy specialist, make this appointment promptly.   When to seek medical advice  Call your healthcare provider or get medical care right away if the following occur:   Coughing  Fever of 100.4°F (38°C) or higher, or as directed by your healthcare provider  Raised red bumps (hives)  Continuing symptoms, new symptoms, or worsening symptoms  Call 911  Call 911 if you have:   Trouble breathing  Severe swelling of the face or severe itching of the eyes or mouth  Wheezing or shortness of breath  Chest tightness  Dizziness or lightheadedness  Feeling of doom  Stomach pain, bloating, vomiting, or diarrhea  [employer] last reviewed this educational content on 10/1/2019  © 5642-4915 [employer]. All rights reserved. This information is not intended as a substitute for professional medical care. Always follow your healthcare professional's instructions.            [FreeTextEntry1] : General appearance: Well nourished and with attention to grooming.No signs of distress. No signs of toxicity.\par  Neck/spine: Examination of the cervical spine reveals normal range of motion in the neck, no midline tenderness, no paraspinal muscle tenderness, no facet tenderness. \par  CV: RRR.\par  Skin: No rash.\par  Musculoskeletal: No joint deformities, no scoliosis, lordosis, kyphosis, pes cavus or hammer toes.\par  Extremities: No peripheral edema.\par  Neurological exam:\par  Mental status: Patient is alert, attentive and oriented X3. Speech is coherent and fluent without dysarthria or aphasia. Affect normal.\par  CN: Pupils were 5 mm and reactive to light. Extraocular movements were full. Visual fields are intact to confrontation. No nystagmus. There was no face, jaw, palate or tongue weakness or atrophy. Facial sensation was normal and symmetric. Hearing was grossly intact. Shoulder shrug was normal.\par  Motor exam revealed normal bulk and tone. There is no evidence of atrophy or fasciculations. There is no pronator drift. Manual muscle testing revealed 5/5 strength throughout including neck flexion/extension and proximal and distal muscles of the arms and legs.\par  Sensory exam demonstrated was normal to touch, pin, proprioception, and vibration in arms and legs. Romberg was negative.\par  DTRs: 2+ b/l biceps, 2+ triceps, 2 + brachioradialis, 2+ patellar, and 2+ ankle reflexes. Plantar responses were flexor bilaterally. No clonus, Ko's or crossed adductor response.\par  Coord: Normal finger to nose testing and rapid alternating movements.\par  Gait: Normal gait. She is able to walk on heels, toes, and tandem without difficulty. \par

## 2024-02-06 NOTE — HISTORY OF PRESENT ILLNESS
[FreeTextEntry1] : 38 year RH female Pmhx HLD, Anxiety, chronic Migraines now 36 weeks pregnant who presents today for follow up.  Reports has been doing very well in regards to headaches but with increased headaches the last 2 weeks. She continues to respond well to Botox tx with significant reduction in intensity and frequency of headaches.    Failed Amitriptyline D/c ( HTN, chest pain, palpitations, weight gain), failed propranolol, Topamax (AE- brain fog), Qulipta, Ajovy - failed AE rash, Vyepti- AE severe congestion , Nurtec QOD, Cymbalta 30 mg daily, Current meds include: Nortriptyline 40 mg q hs, Botox April 25th ( 1 month past due), ONB and Trigger points w/ Dr. Carlos q 6 weeks.  Diclegis ( Nausea and sleep), Cefaly, Ice, APAP prn   Family hx of Migraines in half brother.  She is single and lives in [place]. 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 She does not smoke or vape, denies etoh. Denies illicit drug use. She was exercise regularly but stopped last year.

## 2024-02-06 NOTE — PROCEDURE
[FreeTextEntry1] : The procedure risks, hazards and alternatives were discussed with the patient and appropriate consent was obtained. The patient's left occipital area was palpated to identify location of greater occipital nerve. Alcohol was applied topically to the skin. Using a 27 gauge needle (aspirating during insertion), 2.5 cc of a mixture of 1% lidocaine was injected on the LEFT side (directing needle to center, left and right of painful focus). Pressure with a gauze pad was held briefly upon the site of puncture to minimize bleeding and to further spread anaesthetic subcutaneously. The procedure was repeated on the right side, injecting a further 2.5 cc on that side. The patient tolerated procedure well without any apparent difficulties or complications.   The procedure risks, hazards and alternatives were discussed with the patient and appropriate consent was obtained. The area over the myofascial spasm / pain was prepped with alcohol utilizing sterile technique. After isolating it between two palpating fingertips a 27 gauge 1.5 needle was placed in the center of the myofascial spasm and a negative aspiration was performed. A total of 5 mL of 1% Lidocaine was injected into 3 sites. The patient tolerated procedure well without any apparent difficulties or complications.    CHASE TERESA was monitored in the office for 10 minutes after injections and tolerated procedure well without adverse events.

## 2024-02-06 NOTE — ASSESSMENT
[FreeTextEntry1] : 37 y/o F  Pmhx chronic migraines new daily persistent headaches since April 2021, currently breast feeding.  She is responding well to Botox treatment as well as ONB/trigger points.  Extensively discussed risks and benefits of migraine/headache treatment options in pregnancy including nonpharmacologic, OTC , prescription medications use as well as interventions such as occipital nerve blocks, trigger points and Botox treatment. We discussed that there is limited data on Botox exposure/use in pregnancy and patient understands risks and benefits and would like to continue Botox injections at this time.  - B/L ONB and TPI per today.  - nortriptyline  - continue Botox

## 2024-04-02 DIAGNOSIS — G43.709 CHRONIC MIGRAINE W/OUT AURA, NOT INTRACTABLE, W/OUT STATUS MIGRAINOSUS: ICD-10-CM

## 2024-04-02 RX ORDER — ONABOTULINUMTOXINA 200 [USP'U]/1
200 INJECTION, POWDER, LYOPHILIZED, FOR SOLUTION INTRADERMAL; INTRAMUSCULAR
Qty: 1 | Refills: 3 | Status: ACTIVE | COMMUNITY
Start: 2023-07-03 | End: 1900-01-01

## 2024-04-07 ENCOUNTER — NON-APPOINTMENT (OUTPATIENT)
Age: 39
End: 2024-04-07

## 2024-04-17 ENCOUNTER — OFFICE (OUTPATIENT)
Dept: URBAN - METROPOLITAN AREA CLINIC 27 | Facility: CLINIC | Age: 39
Setting detail: OPHTHALMOLOGY
End: 2024-04-17
Payer: COMMERCIAL

## 2024-04-17 DIAGNOSIS — H16.223: ICD-10-CM

## 2024-04-17 DIAGNOSIS — G43.109: ICD-10-CM

## 2024-04-17 DIAGNOSIS — H57.02: ICD-10-CM

## 2024-04-17 PROCEDURE — 92014 COMPRE OPH EXAM EST PT 1/>: CPT | Performed by: OPHTHALMOLOGY

## 2024-04-24 ENCOUNTER — APPOINTMENT (OUTPATIENT)
Dept: PAIN MANAGEMENT | Facility: CLINIC | Age: 39
End: 2024-04-24
Payer: COMMERCIAL

## 2024-04-24 VITALS
DIASTOLIC BLOOD PRESSURE: 85 MMHG | HEART RATE: 102 BPM | SYSTOLIC BLOOD PRESSURE: 132 MMHG | WEIGHT: 190 LBS | HEIGHT: 62 IN | BODY MASS INDEX: 34.96 KG/M2

## 2024-04-24 PROCEDURE — 99214 OFFICE O/P EST MOD 30 MIN: CPT | Mod: 25

## 2024-04-24 PROCEDURE — 64615 CHEMODENERV MUSC MIGRAINE: CPT

## 2024-04-24 RX ORDER — ZAVEGEPANT 10 MG/.1ML
10 SPRAY NASAL
Qty: 8 | Refills: 5 | Status: ACTIVE | COMMUNITY
Start: 2024-04-24 | End: 1900-01-01

## 2024-04-24 RX ORDER — SERTRALINE HYDROCHLORIDE 50 MG/1
50 TABLET, FILM COATED ORAL
Refills: 0 | Status: ACTIVE | COMMUNITY

## 2024-04-24 RX ORDER — RIMEGEPANT SULFATE 75 MG/75MG
75 TABLET, ORALLY DISINTEGRATING ORAL
Qty: 2 | Refills: 3 | Status: ACTIVE | COMMUNITY
Start: 2024-04-24 | End: 1900-01-01

## 2024-04-24 NOTE — PROCEDURE
[Chronic migraine] : Chronic migraine [Consent Signed] : consent signed [Continue Current Treatment] : continue current treatment [BOTOX 200 Units] : BOTOX 200 units; 5 units per muscle site.  procerus x 1, corragator x 2, frontalis x 4, temporalis x 8, occipitalis x 6, cervical  paraspinal x 4 and trapezius x 6 [Adverse Effects] : no adverse effects [FreeTextEntry1] : 10 units masseter, 5 units B/l crow's feet  30units wasted  [id number], 10/2026

## 2024-04-24 NOTE — ASSESSMENT
[FreeTextEntry1] : 37 y/o F  Pmhx chronic migraines new daily persistent headaches since April 2021.   Extensively discussed risks and benefits of migraine/headache treatment options in pregnancy including nonpharmacologic, OTC , prescription medications use as well as interventions such as occipital nerve blocks, trigger points and Botox treatment. We discussed that there is limited data on Botox exposure/use in pregnancy and patient understands risks and benefits and would like to continue Botox injections at this time.  - Botox tx performed today without AE  - continue nortriptyline 50 mg daily  -She has failed multiple triptan, ubrelvy and nurtec and recommend trial of Zavzpret.  -Trial of Nurtec as ppx.  -- continue Botox

## 2024-04-24 NOTE — HISTORY OF PRESENT ILLNESS
[FreeTextEntry1] : 38 year RH female Pmhx HLD, Anxiety, chronic Migraines now 36 weeks pregnant who presents today for follow up.  Since her last visit, no new medical issues.  She does report increased frequency of her headaches as she has stopped breast feeding.  She is having headaches daily over the last few months with Migraines 2x/week on average.   Failed Amitriptyline D/c ( HTN, chest pain, palpitations, weight gain), failed propranolol, Topamax (AE- brain fog), Qulipta, Ajovy - failed AE rash, Vyepti- AE severe congestion, Nurtec QOD, Cymbalta 30 mg daily, Ubrelvy  Current meds include: Nortriptyline 50 mg q hs, Zoloft 50 mg daily, Labetalol 100 mg 2x/day,  Botox, Cefaly, Ice, APAP prn   Family hx of Migraines in half brother.  She is single and lives in [place]. She has a new born at home [place]. 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 Denies illicit drug use. She was exercise regularly but stopped last year.

## 2024-05-02 ENCOUNTER — NON-APPOINTMENT (OUTPATIENT)
Age: 39
End: 2024-05-02

## 2024-05-03 ENCOUNTER — TRANSCRIPTION ENCOUNTER (OUTPATIENT)
Age: 39
End: 2024-05-03

## 2024-05-07 ENCOUNTER — APPOINTMENT (OUTPATIENT)
Dept: PAIN MANAGEMENT | Facility: CLINIC | Age: 39
End: 2024-05-07
Payer: COMMERCIAL

## 2024-05-07 VITALS
SYSTOLIC BLOOD PRESSURE: 129 MMHG | HEART RATE: 99 BPM | WEIGHT: 192 LBS | BODY MASS INDEX: 35.33 KG/M2 | DIASTOLIC BLOOD PRESSURE: 84 MMHG | HEIGHT: 62 IN

## 2024-05-07 PROCEDURE — 64405 NJX AA&/STRD GR OCPL NRV: CPT | Mod: 50,59

## 2024-05-07 PROCEDURE — 99214 OFFICE O/P EST MOD 30 MIN: CPT | Mod: 25

## 2024-05-07 PROCEDURE — 20552 NJX 1/MLT TRIGGER POINT 1/2: CPT

## 2024-05-07 NOTE — PROCEDURE
[FreeTextEntry1] : The procedure risks, hazards and alternatives were discussed with the patient and appropriate consent was obtained. The patient's left occipital area was palpated to identify location of greater occipital nerve. Alcohol was applied topically to the skin. Using a 27 gauge needle (aspirating during insertion), 2.5 cc of a mixture of Kenalog mg, 1% lidocaine was injected on the LEFT side (directing needle to center, left and right of painful focus). Pressure with a gauze pad was held briefly upon the site of puncture to minimize bleeding and to further spread anaesthetic subcutaneously. The procedure was repeated on the right side, injecting a further 2.5 cc on that side. The patient tolerated procedure well without any apparent difficulties or complications.   The procedure risks, hazards and alternatives were discussed with the patient and appropriate consent was obtained. The area over the myofascial spasm / pain was prepped with alcohol utilizing sterile technique. After isolating it between two palpating fingertips a 27 gauge 1.5 needle was placed in the center of the myofascial spasm and a negative aspiration was performed. A total of 5 mL of 1% Lidocaine mixed with Kenalog 40 mg was injected into 3 sites. The patient tolerated procedure well without any apparent difficulties or complications.   CHASE TERESA was monitored in the office for 10 minutes after injections and tolerated procedure well without adverse events.

## 2024-05-07 NOTE — HISTORY OF PRESENT ILLNESS
[FreeTextEntry1] : 39 year RH female Pmhx HLD, Anxiety, chronic Migraines now 36 weeks pregnant who presents today for follow up.  Since her last visit, no new medical issues.  She does report increased frequency of her headaches as she has stopped breast feeding.  She is having headaches daily over the last few months with Migraines 2x/week on average.   Failed Amitriptyline D/c ( HTN, chest pain, palpitations, weight gain), failed propranolol, Topamax (AE- brain fog), Qulipta, Ajovy - failed AE rash, Vyepti- AE severe congestion, Nurtec QOD, Cymbalta 30 mg daily, Ubrelvy  Current meds include: Nortriptyline 50 mg q hs, Zoloft 50 mg daily, Labetalol 100 mg 2x/day,  Botox, Cefaly, Ice, APAP prn   Family hx of Migraines in half brother.  She is single and lives in [place]. She has a new born at home [place]. 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 Denies illicit drug use. She was exercise regularly but stopped last year.

## 2024-05-07 NOTE — ASSESSMENT
[FreeTextEntry1] : 40 y/o F  Pmhx chronic migraines new daily persistent headaches since April 2021.   Extensively discussed risks and benefits of migraine/headache treatment options in pregnancy including nonpharmacologic, OTC , prescription medications use as well as interventions such as occipital nerve blocks, trigger points and Botox treatment. We discussed that there is limited data on Botox exposure/use in pregnancy and patient understands risks and benefits and would like to continue Botox injections at this time.  Nabumetone 750mg 2x/day x 10 days then prn.  - B/L ONB and TRG performed today without AE  - continue nortriptyline 50 mg daily  -She has failed multiple triptan, ubrelvy and nurtec and recommend trial of Zavzpret.  -Trial of Nurtec as ppx.  -- continue Botox

## 2024-06-03 ENCOUNTER — TRANSCRIPTION ENCOUNTER (OUTPATIENT)
Age: 39
End: 2024-06-03

## 2024-06-03 RX ORDER — KETOROLAC TROMETHAMINE 10 MG/1
10 TABLET, FILM COATED ORAL 3 TIMES DAILY
Qty: 9 | Refills: 0 | Status: ACTIVE | COMMUNITY
Start: 2024-06-03 | End: 1900-01-01

## 2024-06-03 RX ORDER — NABUMETONE 750 MG/1
750 TABLET, FILM COATED ORAL
Qty: 60 | Refills: 1 | Status: DISCONTINUED | COMMUNITY
Start: 2024-05-07 | End: 2024-06-03

## 2024-06-03 RX ORDER — CANDESARTAN CILEXETIL 4 MG/1
4 TABLET ORAL
Qty: 60 | Refills: 3 | Status: ACTIVE | COMMUNITY
Start: 2024-06-03 | End: 1900-01-01

## 2024-06-04 ENCOUNTER — APPOINTMENT (OUTPATIENT)
Dept: PAIN MANAGEMENT | Facility: CLINIC | Age: 39
End: 2024-06-04
Payer: COMMERCIAL

## 2024-06-04 VITALS
HEART RATE: 97 BPM | SYSTOLIC BLOOD PRESSURE: 126 MMHG | WEIGHT: 194 LBS | HEIGHT: 62 IN | DIASTOLIC BLOOD PRESSURE: 84 MMHG | BODY MASS INDEX: 35.7 KG/M2

## 2024-06-04 PROCEDURE — 64405 NJX AA&/STRD GR OCPL NRV: CPT | Mod: 50,59

## 2024-06-04 PROCEDURE — 99214 OFFICE O/P EST MOD 30 MIN: CPT | Mod: 25

## 2024-06-04 PROCEDURE — 20553 NJX 1/MLT TRIGGER POINTS 3/>: CPT

## 2024-06-04 NOTE — HISTORY OF PRESENT ILLNESS
[FreeTextEntry1] : 39 year RH female Pmhx HLD, Anxiety, chronic Migraines now 36 weeks pregnant who presents today for follow up.  Since her last visit, reports significant increase in the frequency and intensity of her headaches which coincides with the time she stopped  breast feeding and is now having headaches daily.   She is having headaches daily over the last few months with Migraines 6x/week on average. She failed nabumetone, tried Zavzpret no help.    Failed Amitriptyline D/c ( HTN, chest pain, palpitations, weight gain), failed propranolol, Topamax (AE- brain fog), Qulipta, Ajovy - failed AE rash, Vyepti- AE severe congestion, Nurtec QOD, Cymbalta 30 mg daily, Ubrelvy  Current meds include: Nortriptyline 50 mg q hs, Zoloft 50 mg daily, Labetalol 100 mg 2x/day, Botox, Cefaly, Ice, APAP prn   Family hx of Migraines in half brother.  She is single and lives in [place]. She has a new born at home [place]. 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 Denies illicit drug use. She was exercise regularly but stopped last year.

## 2024-06-04 NOTE — ASSESSMENT
[FreeTextEntry1] : 40 y/o F  Pmhx chronic migraines new daily persistent headaches since April 2021.   - B/L ONB and TRG performed today without AE  Ketorolac 10 mg 3x/day with meals x 3 days starting tomorrow then d/c -Advised to d/c all OTC NSAID use.  - continue nortriptyline 50 mg daily , previously on higher dose but decreased given Zoloft.  -Consider adding Cadesartan 4mg daily and titrate up as tolerated.  -She has failed multiple triptan, ubrelvy and nurtec and recommend trial of Zavzpret.  -Trial of Nurtec as ppx.  -- continue Botox

## 2024-06-04 NOTE — PROCEDURE
[FreeTextEntry1] : The procedure risks, hazards and alternatives were discussed with the patient and appropriate consent was obtained. The patient's left occipital area was palpated to identify location of greater occipital nerve. Alcohol was applied topically to the skin. Using a 27 gauge needle (aspirating during insertion), 2.5 cc of 1% lidocaine was injected on the LEFT side (directing needle to center, left and right of painful focus). Pressure with a gauze pad was held briefly upon the site of puncture to minimize bleeding and to further spread anaesthetic subcutaneously. The procedure was repeated on the right side, injecting a further 2.5 cc on that side. The patient tolerated procedure well without any apparent difficulties or complications.   The procedure risks, hazards and alternatives were discussed with the patient and appropriate consent was obtained. The area over the myofascial spasm / pain was prepped with alcohol utilizing sterile technique. After isolating it between two palpating fingertips a 27 gauge 1.5 needle was placed in the center of the myofascial spasm and a negative aspiration was performed. A total of 5 mL of 1% Lidocaine mixed with Ketorolac 30 mg was injected into 3 sitesleft traps and C paraspinal. The patient tolerated procedure well without any apparent difficulties or complications.   CHASE TERESA was monitored in the office for 10 minutes after injections and tolerated procedure well without adverse events.

## 2024-06-10 ENCOUNTER — TRANSCRIPTION ENCOUNTER (OUTPATIENT)
Age: 39
End: 2024-06-10

## 2024-06-18 ENCOUNTER — APPOINTMENT (OUTPATIENT)
Dept: MRI IMAGING | Facility: CLINIC | Age: 39
End: 2024-06-18
Payer: COMMERCIAL

## 2024-06-18 ENCOUNTER — OUTPATIENT (OUTPATIENT)
Dept: OUTPATIENT SERVICES | Facility: HOSPITAL | Age: 39
LOS: 1 days | End: 2024-06-18
Payer: COMMERCIAL

## 2024-06-18 DIAGNOSIS — G43.709 CHRONIC MIGRAINE WITHOUT AURA, NOT INTRACTABLE, WITHOUT STATUS MIGRAINOSUS: ICD-10-CM

## 2024-06-18 DIAGNOSIS — K08.409 PARTIAL LOSS OF TEETH, UNSPECIFIED CAUSE, UNSPECIFIED CLASS: Chronic | ICD-10-CM

## 2024-06-18 PROCEDURE — 70551 MRI BRAIN STEM W/O DYE: CPT | Mod: 26

## 2024-06-18 PROCEDURE — 70551 MRI BRAIN STEM W/O DYE: CPT

## 2024-06-19 ENCOUNTER — TRANSCRIPTION ENCOUNTER (OUTPATIENT)
Age: 39
End: 2024-06-19

## 2024-07-30 ENCOUNTER — NON-APPOINTMENT (OUTPATIENT)
Age: 39
End: 2024-07-30

## 2024-07-30 ENCOUNTER — APPOINTMENT (OUTPATIENT)
Dept: PAIN MANAGEMENT | Facility: CLINIC | Age: 39
End: 2024-07-30
Payer: COMMERCIAL

## 2024-07-30 PROCEDURE — 64615 CHEMODENERV MUSC MIGRAINE: CPT

## 2024-07-30 PROCEDURE — 99214 OFFICE O/P EST MOD 30 MIN: CPT | Mod: 25

## 2024-07-30 NOTE — HISTORY OF PRESENT ILLNESS
[FreeTextEntry1] : 39 year RH female Pmhx HLD, Anxiety, chronic Migraines now 36 weeks pregnant who presents today for follow up.  No new medical issues. Responding well to Botox.     Failed Amitriptyline D/c ( HTN, chest pain, palpitations, weight gain), failed propranolol, Topamax (AE- brain fog), Qulipta, Ajovy - failed AE rash, Vyepti- AE severe congestion, Nurtec QOD, Cymbalta 30 mg daily, Ubrelvy  Current meds include: Nortriptyline 50 mg q hs, Zoloft 50 mg daily, Labetalol 100 mg 2x/day, Botox, Cefaly, Ice, APAP prn   Family hx of Migraines in half brother.  She is single and lives in [place]. She has a new born at home [place]. 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 She does not smoke or vape, denies etoh. Denies illicit drug use. She was exercise regularly but stopped last year. Moving to [place].

## 2024-07-30 NOTE — PROCEDURE
[Adverse Effects] : no adverse effects [FreeTextEntry1] : 10 units masseter, 5 units B/l crow's feet  30units wasted  [id number], 12/2026

## 2024-07-30 NOTE — ASSESSMENT
[FreeTextEntry1] : 38 y/o F  Pmhx chronic migraines new daily persistent headaches since April 2021. Moving to [place]   Botox  -Advised to d/c all OTC NSAID use.  - continue nortriptyline 50 mg daily , previously on higher dose but decreased given Zoloft.  -Consider adding Cadesartan 4mg daily and titrate up as tolerated.  -She has failed multiple triptan, ubrelvy and nurtec and recommend trial of Zavzpret.  -Trial of Nurtec as ppx.  -- continue Botox

## 2024-09-11 ENCOUNTER — TRANSCRIPTION ENCOUNTER (OUTPATIENT)
Age: 39
End: 2024-09-11

## 2024-09-23 ENCOUNTER — TRANSCRIPTION ENCOUNTER (OUTPATIENT)
Age: 39
End: 2024-09-23

## 2024-09-24 ENCOUNTER — TRANSCRIPTION ENCOUNTER (OUTPATIENT)
Age: 39
End: 2024-09-24

## 2024-10-09 ENCOUNTER — TRANSCRIPTION ENCOUNTER (OUTPATIENT)
Age: 39
End: 2024-10-09

## 2024-10-11 ENCOUNTER — APPOINTMENT (OUTPATIENT)
Dept: PAIN MANAGEMENT | Facility: CLINIC | Age: 39
End: 2024-10-11
Payer: COMMERCIAL

## 2024-10-11 VITALS
HEIGHT: 62 IN | WEIGHT: 180 LBS | HEART RATE: 106 BPM | BODY MASS INDEX: 33.13 KG/M2 | SYSTOLIC BLOOD PRESSURE: 141 MMHG | DIASTOLIC BLOOD PRESSURE: 93 MMHG

## 2024-10-11 PROCEDURE — 99214 OFFICE O/P EST MOD 30 MIN: CPT | Mod: 25

## 2024-10-11 PROCEDURE — 64405 NJX AA&/STRD GR OCPL NRV: CPT | Mod: 50,59

## 2024-10-11 PROCEDURE — 20553 NJX 1/MLT TRIGGER POINTS 3/>: CPT

## 2024-11-01 ENCOUNTER — APPOINTMENT (OUTPATIENT)
Dept: PAIN MANAGEMENT | Facility: CLINIC | Age: 39
End: 2024-11-01
Payer: COMMERCIAL

## 2024-11-01 VITALS
BODY MASS INDEX: 33.13 KG/M2 | HEART RATE: 94 BPM | WEIGHT: 180 LBS | HEIGHT: 62 IN | SYSTOLIC BLOOD PRESSURE: 121 MMHG | DIASTOLIC BLOOD PRESSURE: 85 MMHG

## 2024-11-01 PROCEDURE — 99214 OFFICE O/P EST MOD 30 MIN: CPT | Mod: 25

## 2024-11-01 PROCEDURE — 64615 CHEMODENERV MUSC MIGRAINE: CPT

## 2024-11-23 NOTE — H&P PST ADULT - FUNCTIONAL LEVEL PRIOR: TOILETING
[TextEntry] : Perineum: flap skin with healthy granulation tissue, no necrosis visible, no gross signs of infection, no surrounding erythema, WTD dressing applied with abd pad  Left inner thigh: incision is well healed however there is a small open area in the middle of incision with serosanguinous drainage, expressed scant amt, no fluctuance, low suspicion of fluid collection, +dry flaky skin
(0) independent

## 2024-11-26 NOTE — OB PROVIDER DELIVERY SUMMARY - NSSELHIDDEN_OBGYN_ALL_OB_FT
[[email or web address]_OB_FT:[email or web address]],[NS_DeliveryRN_OBGYN_ALL_OB_FT:[id number]]
Detail Level: Detailed
Procedure To Be Performed At Next Visit: Excision
X Size Of Lesion In Cm (Optional): 1.3
Introduction Text (Please End With A Colon): Deferred procedure at this time;
Size Of Lesion In Cm (Optional): 1.5
Reason To Defer Override: Patient to schedule cyst removal with Dr. Camarena

## 2024-12-02 NOTE — ED ADULT NURSE NOTE - EXTENSIONS OF SELF_ADULT
Per insurance, no PA is needed for this medication. Patient can  the prescription on 12/23/2024. Last fill was for 84 days on 10/21/2024.      WPA team will close and exit this encounter. Thank you!   None

## 2025-02-07 ENCOUNTER — APPOINTMENT (OUTPATIENT)
Dept: PAIN MANAGEMENT | Facility: CLINIC | Age: 40
End: 2025-02-07

## 2025-02-07 ENCOUNTER — RX RENEWAL (OUTPATIENT)
Age: 40
End: 2025-02-07

## 2025-02-07 ENCOUNTER — NON-APPOINTMENT (OUTPATIENT)
Age: 40
End: 2025-02-07